# Patient Record
Sex: MALE | Race: OTHER | Employment: FULL TIME | URBAN - METROPOLITAN AREA
[De-identification: names, ages, dates, MRNs, and addresses within clinical notes are randomized per-mention and may not be internally consistent; named-entity substitution may affect disease eponyms.]

---

## 2022-02-14 ENCOUNTER — HOSPITAL ENCOUNTER (OUTPATIENT)
Dept: PHYSICAL THERAPY | Age: 57
Discharge: HOME | End: 2022-02-14
Payer: COMMERCIAL

## 2022-02-14 PROCEDURE — 97110 THERAPEUTIC EXERCISES: CPT

## 2022-02-14 PROCEDURE — 97140 MANUAL THERAPY 1/> REGIONS: CPT

## 2022-02-14 PROCEDURE — 97161 PT EVAL LOW COMPLEX 20 MIN: CPT

## 2022-02-14 NOTE — THERAPY EVALUATION
Sandra Mars  : 1965  Primary: Americo Mcintyre [facility]  Secondary:  [facility] at [address]  [phone]   [phone]        OUTPATIENT PHYSICAL THERAPY:Initial Assessment 2022   ICD-10: Treatment Diagnosis: Patellar Tendonitis, L knee [M76.52]; Chondromalacia patellae, L knee [M22.42]  Precautions/Allergies:   Patient has no known allergies. TREATMENT PLAN:  Effective Dates: 2022 TO 5/15/2022 (90 days). Frequency/Duration: 2 times a week for 90 Day(s) MEDICAL/REFERRING DIAGNOSIS:  Patellar tendinitis, unspecified knee [M76.50]  Chondromalacia patellae, left knee [M22.42]   DATE OF ONSET: chronic  REFERRING PHYSICIAN: Mohinder Lewis MD MD Orders: Evaluate and Treat  Return MD Appointment: TBD     INITIAL ASSESSMENT:  Mr. Carmen San presents with signs and symptoms of referring diagnosis. Pt. Sustained a patellar fracture over a year ago and has not been able to fully get over knee pain since injury. Pt. Experienced pain in the L patellar tendon with squatting activities and during palpation of the tendon. Mobility of the L anterior interval and patellofemoral joint is restricted compared to R side. Meniscus and ligamentous testing is negative. Pt. Demonstrates remaining quadriceps atrophy in the L thigh compared to R side. IT band tightness is also present in the L LE compared to R. Pt. Will benefit from manual therapy to address remaining pain and specific strengthening exercises to improve L quadriceps strength. Pt. Will benefit from skilled PT at this time. PROBLEM LIST (Impacting functional limitations):  1. Decreased Strength  2. Decreased ADL/Functional Activities  3. Increased Pain  4. Decreased Activity Tolerance  5. Decreased Flexibility/Joint Mobility INTERVENTIONS PLANNED: (Treatment may consist of any combination of the following)  1. Home Exercise Program (HEP)  2.  Manual Therapy  3. Neuromuscular Re-education/Strengthening  4. Range of Motion (ROM)  5. Therapeutic Activites  6. Therapeutic Exercise/Strengthening     GOALS: (Goals have been discussed and agreed upon with patient.)  Discharge Goals: Time Frame: 6 weeks  1. Pt. Will report < 4/10 L knee pain with standing, walking, lifting, squatting to improve symptomatic complaints. 2. Pt. Will complete 10 single LE squats without handheld assistance to improve lower quarter strength. 3. Pt. Will be independent with HEP to prevent return of symptoms. 4. Pt. Will score > 70 on the LEFS to demonstrate improved pain and function. OUTCOME MEASURE:   Tool Used: Lower Extremity Functional Scale (LEFS)  Score:  Initial: 62/80 Most Recent: X/80 (Date: -- )   Interpretation of Score: 20 questions each scored on a 5 point scale with 0 representing \"extreme difficulty or unable to perform\" and 4 representing \"no difficulty\". The lower the score, the greater the functional disability. 80/80 represents no disability. Minimal detectable change is 9 points. MEDICAL NECESSITY:   · Patient is expected to demonstrate progress in strength and range of motion to increase independence with ADL and recreational activities. .  REASON FOR SERVICES/OTHER COMMENTS:  · Patient will benefit from skilled PT to address impairments identified through evaluation and return to previous ADL and recreational capacity. Total Duration:  PT Patient Time In/Time Out  Time In: 0930  Time Out: 1030    Rehabilitation Potential For Stated Goals: Good  Regarding Ruthie Zimmerman's therapy, I certify that the treatment plan above will be carried out by a therapist or under their direction. Thank you for this referral,  Shira Epperson PT     Referring Physician Signature: Shahana Maciel MD No Signature is Required for this note.       PAIN/SUBJECTIVE:   Initial: Pain Intensity 1: 8 /10 Post Session:  8/10   HISTORY:   History of Injury/Illness (Reason for Referral):  Pt. Attends PT c/o chronic L knee pain with onset a year and a half ago after falling on the knee and fracturing his patella. Pt. Notes experiencing residual pain in the anterior knee aggravated with prolonged standing, walking, running, kneeling, and squatting. Pt. Reports he will feel as if his L knee gives way with quick movements or unexpected movements on the L knee. Pt. Reports significant difficulty with ascending and descending stairs secondary to pain. Pt. Would like to address pain and return to previous level of function. Past Medical History/Comorbidities:   Mr. Janna Butler  has a past medical history of Hypertriglyceridemia (4/30/2015), IBS (irritable bowel syndrome) (4/30/2015), Vitamin B12 deficiency, and Vitamin D deficiency. Mr. Janna Butler  has no past surgical history on file. Social History/Living Environment:     Lives with wife in two story home. Prior Level of Function/Work/Activity:  Functioned independently without limitations. Ambulatory/Rehab Services H2 Model Falls Risk Assessment   Risk Factors:       (1)  Gender [Male] Ability to Rise from Chair:       (0)  Ability to rise in a single movement   Falls Prevention Plan:       No modifications necessary   Total: (5 or greater = High Risk): 1   ©2010 [facility]. All Rights Reserved. [facility] Patent #[id number]. Federal Law prohibits the replication, distribution or use without written permission from [facility]   Current Medications:       Current Outpatient Medications:     dicyclomine (BENTYL) 10 mg capsule, Take 1 Cap by mouth three (3) times daily as needed for Other (Bloating). , Disp: 90 Cap, Rfl: 1    fexofenadine (ALLEGRA) 180 mg tablet, Take  by mouth daily. , Disp: , Rfl:     cyanocobalamin (VITAMIN B-12) 1,000 mcg tablet, Take 1,000 mcg by mouth daily. , Disp: , Rfl:     Cholecalciferol, Vitamin D3, (VITAMIN D3) 1,000 unit cap, Take  by mouth., Disp: , Rfl:    Date Last Reviewed: 2/14/2022   Number of Personal Factors/Comorbidities that affect the Plan of Care: 0: LOW COMPLEXITY   EXAMINATION:     Palpation:                                             Right                                         Left  Quadriceps: -- tender   Hamstrings -- --   Triceps Surae tender tender   Patellar Tendon -- tender   Pes Anserine -- --   Iliotibial Band -- tender   Other:       Range of Motion:   degrees                              Right                                        Left  Knee Extension 2 2   Knee Flexion 132 130   Ankle Dorsiflexion 8 10     Strength:                                                Right                                        Left  Hip Extension 4+/5 4+/5   Hip Abduction 4+/5 4+/5   Knee Extension 5/5 4+/5   Knee Flexion 5/5 5/5   Functional Squat Limited and painful in L knee      Flexibility:  Iliopsoas: Hamstring: Quadriceps: limited   TFL: limited Calf:        Special Testing:                                    Right                                        Left  Lachman (-) (-)   Posterior Drawer (-) (-)   Valgus Stress (-) (-)   Varus Stress (-) (-)   Amanda (-) (-)   Thessaly (-) (-)   Joint Line Tenderness (-) (-)        Body Structures Involved:  1. Bones  2. Joints  3. Muscles Body Functions Affected:  1. Neuromusculoskeletal  2. Movement Related Activities and Participation Affected:  1. Mobility  2.  Self Care   Number of elements (examined above) that affect the Plan of Care: 1-2: LOW COMPLEXITY   CLINICAL PRESENTATION:   Presentation: Stable and uncomplicated: LOW COMPLEXITY   CLINICAL DECISION MAKING:   Use of outcome tool(s) and clinical judgement create a POC that gives a: Clear prediction of patient's progress: LOW COMPLEXITY

## 2022-02-14 NOTE — PROGRESS NOTES
Gamaliel Cullen  : 1965  Primary: Meg Minors [facility]  Secondary:  [facility] at [address]  [phone]   [phone]      OUTPATIENT PHYSICAL THERAPY: Daily Treatment Note 2022  Visit Count:  1    ICD-10: Treatment Diagnosis: Patellar Tendonitis, L knee [M76.52]; Chondromalacia patellae, L knee [M22.42]  Precautions/Allergies:   Patient has no known allergies. TREATMENT PLAN:  Effective Dates: 2022 TO 5/15/2022 (90 days). Frequency/Duration: 2 times a week for 90 Day(s) MEDICAL/REFERRING DIAGNOSIS:  Patellar tendinitis, unspecified knee [M76.50]  Chondromalacia patellae, left knee [M22.42]   DATE OF ONSET: chronic  REFERRING PHYSICIAN: Ector Avendano MD MD Orders: Evaluate and Treat  Return MD Appointment: TBD       Pre-treatment Symptoms/Complaints:  L knee pain  Pain: Initial: Pain Intensity 1: 8 /10 Post Session:  8/10   Medications Last Reviewed:  2022  Updated Objective Findings:  See evaluation note from today  TREATMENT:     Therapeutic Exercise: (10 Minutes):  Exercises per grid below to improve mobility and strength. Required moderate visual, verbal and manual cues to promote proper body alignment, promote proper body posture and promote proper body mechanics. Progressed resistance, range and repetitions as indicated. Date:  2022   Activity/Exercise Parameters   Quadriceps setting 3 x 10   Quadriceps stretch 3 minutes   IT band stretch 3 minutes                     Manual Therapy (    Soft Tissue Mobilization Duration  Duration: 15 Minutes): Manual techniques to facilitate improved motion and decreased pain.  (Used abbreviations: MET - muscle energy technique; PNF - proprioceptive neuromuscular facilitation; NMR - neuromuscular re-education; a/p - anterior to posterior; p/a - posterior to anterior)   · Joint mobilization: patellofemoral all glides grade V  · Soft tissue mobilization: L quadriceps, patellar tendon, gluteals  · Instrument assisted soft tissue mobilizaiton: L patellar tendon, L gastrocnemius. MedBridge Portal  Treatment/Session Summary:    · Response to Treatment:  Pt. tolerates initial treatment today with mild pain complaints and post treatment soreness. · Communication/Consultation:  None today  · Equipment provided today:  None today  · Recommendations/Intent for next treatment session: Next visit will focus on Manual therapy and quadriceps strengthening. Total Treatment Billable Duration:  35 minutes evaluation, 10 minutes therapeutic exercises, 15 minutes manual therapy.    PT Patient Time In/Time Out  Time In: 0930  Time Out: [address]  Gams, PT    Future Appointments   Date Time Provider Robin Leal   2/21/2022  2:30 PM Jigar Valverde [facility]   2/28/2022  1:30 PM Laura Hutchison, PT [facility]   3/7/2022 10:30 AM Laura Hutchison, PT [facility]   3/14/2022 10:30 AM Laura Hutchison PT [facility]   3/21/2022 10:30 AM Laura Hutchison PT [facility]   3/28/2022 10:30 AM Laura Hutchison, PT [facility]

## 2022-02-21 ENCOUNTER — HOSPITAL ENCOUNTER (OUTPATIENT)
Dept: PHYSICAL THERAPY | Age: 57
Discharge: HOME | End: 2022-02-21
Payer: COMMERCIAL

## 2022-02-21 PROCEDURE — 97140 MANUAL THERAPY 1/> REGIONS: CPT

## 2022-02-21 PROCEDURE — 97110 THERAPEUTIC EXERCISES: CPT

## 2022-02-21 NOTE — PROGRESS NOTES
Surekha Goodman  : 1965  Primary: Gillian Marleny [facility]  Secondary:  [facility] at [address]  [phone]   [phone]      OUTPATIENT PHYSICAL THERAPY: Daily Treatment Note 2022  Visit Count:  2    ICD-10: Treatment Diagnosis: Patellar Tendonitis, L knee [M76.52]; Chondromalacia patellae, L knee [M22.42]  Precautions/Allergies:   Patient has no known allergies. TREATMENT PLAN:  Effective Dates: 2022 TO 5/15/2022 (90 days). Frequency/Duration: 2 times a week for 90 Day(s) MEDICAL/REFERRING DIAGNOSIS:  Patellar tendinitis, unspecified knee [M76.50]  Chondromalacia patellae, left knee [M22.42]   DATE OF ONSET: chronic  REFERRING PHYSICIAN: Colby Lyons MD MD Orders: Evaluate and Treat  Return MD Appointment: TBD       Pre-treatment Symptoms/Complaints:  Pt. Notes a little L knee pain improvement following initial treatment that returned after performing extra duties at work. Pain: Initial: Pain Intensity 1: 10 Post Session:  7/10   Medications Last Reviewed:  2022  Updated Objective Findings:  None Today  TREATMENT:     Therapeutic Exercise: (30 Minutes):  Exercises per grid below to improve mobility and strength. Required moderate visual, verbal and manual cues to promote proper body alignment, promote proper body posture and promote proper body mechanics. Progressed resistance, range and repetitions as indicated. Date:  2022   Activity/Exercise Parameters   Quadriceps setting 3 x 10   Quadriceps stretch 3 minutes   IT band stretch 3 minutes   SLR 45 reps   Eccentric short arc quad 3 x 10   Eccentric modified squat (single and double LE with UE support) 6 minutes   Calf stretch 3 minutes     Manual Therapy (    Soft Tissue Mobilization Duration  Duration: 25 Minutes): Manual techniques to facilitate improved motion and decreased pain.  (Used abbreviations: MET - muscle energy technique; PNF - proprioceptive neuromuscular facilitation; NMR - neuromuscular re-education; a/p - anterior to posterior; p/a - posterior to anterior)   · Joint mobilization: patellofemoral all glides grade IV  · Soft tissue mobilization: L quadriceps, patellar tendon, gluteals  · Instrument assisted soft tissue mobilizaiton: L patellar tendon, L gastrocnemius. MedBridge Portal  Treatment/Session Summary:    · Response to Treatment:  Lateral patellar glide during squatting exercises improves pain during movement. Pt. demonstrates moderate difficulty performing single LE squat on L LE secondary to weakness of the L quadriceps and pain. Pt. may benefit from taping in the L knee. .  · Communication/Consultation:  None today  · Equipment provided today:  None today  · Recommendations/Intent for next treatment session: Next visit will focus on Manual therapy and quadriceps strengthening. Total Treatment Billable Duration:  55 minutes total time.     PT Patient Time In/Time Out  Time In: 1430  Time Out: Matias Up Gams, PT    Future Appointments   Date Time Provider Robin Leal   2/28/2022  1:30 PM Vivien Quivers, PT [facility]   3/7/2022 10:30 AM Vivien Quivers, PT [facility]   3/14/2022 10:30 AM Vivien Quivers, PT [facility]   3/21/2022 10:30 AM Vivien Quivers, PT [facility]   3/28/2022 10:30 AM Vivien Quivers, PT [facility]

## 2022-02-28 ENCOUNTER — HOSPITAL ENCOUNTER (OUTPATIENT)
Dept: PHYSICAL THERAPY | Age: 57
Discharge: HOME | End: 2022-02-28
Payer: COMMERCIAL

## 2022-02-28 PROCEDURE — 97140 MANUAL THERAPY 1/> REGIONS: CPT

## 2022-02-28 PROCEDURE — 97110 THERAPEUTIC EXERCISES: CPT

## 2022-02-28 NOTE — PROGRESS NOTES
Izzy List  : 1965  Primary: Bam Allen [facility]  Secondary:  [facility] at [address]  [phone]   [phone]      OUTPATIENT PHYSICAL THERAPY: Daily Treatment Note 2022  Visit Count:  3    ICD-10: Treatment Diagnosis: Patellar Tendonitis, L knee [M76.52]; Chondromalacia patellae, L knee [M22.42]  Precautions/Allergies:   Patient has no known allergies. TREATMENT PLAN:  Effective Dates: 2022 TO 5/15/2022 (90 days). Frequency/Duration: 2 times a week for 90 Day(s) MEDICAL/REFERRING DIAGNOSIS:  Patellar tendinitis, unspecified knee [M76.50]  Chondromalacia patellae, left knee [M22.42]   DATE OF ONSET: chronic  REFERRING PHYSICIAN: Delio José MD MD Orders: Evaluate and Treat  Return MD Appointment: TBD       Pre-treatment Symptoms/Complaints:  Pt. Denies new pain complaints this week. Pain: Initial: Pain Intensity 1: 10 Post Session:  7/10   Medications Last Reviewed:  2022  Updated Objective Findings:  Improved tenderness to palpation of the L patellar tendon. TREATMENT:     Therapeutic Exercise: (40 Minutes):  Exercises per grid below to improve mobility and strength. Required moderate visual, verbal and manual cues to promote proper body alignment, promote proper body posture and promote proper body mechanics. Progressed resistance, range and repetitions as indicated. Date:  2022   Activity/Exercise Parameters   Quadriceps setting 3 x 10   Quadriceps stretch 3 minutes   IT band stretch 3 minutes   SLR 45 reps   Eccentric short arc quad --   Eccentric modified squat (single and double LE with UE support) 6 minutes   Calf stretch 3 minutes   Shuttle Squats single and double 5 minutes   Sidelying hip abduction 3 x 10   clams 3 x 10     Manual Therapy (    Soft Tissue Mobilization Duration  Duration: 15 Minutes): Manual techniques to facilitate improved motion and decreased pain. (Used abbreviations: MET - muscle energy technique; PNF - proprioceptive neuromuscular facilitation; NMR - neuromuscular re-education; a/p - anterior to posterior; p/a - posterior to anterior)   · Joint mobilization: patellofemoral all glides grade IV  · Soft tissue mobilization: L quadriceps, patellar tendon, gluteals  · Instrument assisted soft tissue mobilizaiton: L patellar tendon, L gastrocnemius. MedBridge Portal  Treatment/Session Summary:    · Response to Treatment:  McConnel tape is utilized today with a medial glide applied. Taping allowed for improved tolerance to quadriceps strengthening with less pain. .  · Communication/Consultation:  None today  · Equipment provided today:  None today  · Recommendations/Intent for next treatment session: Next visit will focus on Manual therapy and quadriceps strengthening. Total Treatment Billable Duration:  55 minutes total time.     PT Patient Time In/Time Out  Time In: 1330  Time Out: 205 Juli Murry PT    Future Appointments   Date Time Provider Robin Leal   3/7/2022 10:30 AM Jerman Ryan PT [facility]   3/14/2022 10:30 AM Jerman Ryan PT [facility]   3/21/2022 10:30 AM Jerman Ryan PT [facility]   3/28/2022 10:30 AM Jerman Ryan PT [facility]

## 2022-03-07 ENCOUNTER — HOSPITAL ENCOUNTER (OUTPATIENT)
Dept: PHYSICAL THERAPY | Age: 57
Discharge: HOME | End: 2022-03-07
Payer: COMMERCIAL

## 2022-03-07 PROCEDURE — 97140 MANUAL THERAPY 1/> REGIONS: CPT

## 2022-03-07 PROCEDURE — 97110 THERAPEUTIC EXERCISES: CPT

## 2022-03-07 NOTE — PROGRESS NOTES
Bimal Galindo  : 1965  Primary: Sandy Amaro [facility]  Secondary:  [facility] at [address]  [phone]   [phone]      OUTPATIENT PHYSICAL THERAPY: Daily Treatment Note 3/7/2022  Visit Count:  4    ICD-10: Treatment Diagnosis: Patellar Tendonitis, L knee [M76.52]; Chondromalacia patellae, L knee [M22.42]  Precautions/Allergies:   Patient has no known allergies. TREATMENT PLAN:  Effective Dates: 2022 TO 5/15/2022 (90 days). Frequency/Duration: 2 times a week for 90 Day(s) MEDICAL/REFERRING DIAGNOSIS:  Patellar tendinitis, unspecified knee [M76.50]  Chondromalacia patellae, left knee [M22.42]   DATE OF ONSET: chronic  REFERRING PHYSICIAN: Shahana Maciel MD MD Orders: Evaluate and Treat  Return MD Appointment: TBD       Pre-treatment Symptoms/Complaints:  Pt. Note L knee pain was a little better. Pt. Aggravated pain with lifting at work. Pain: Initial: Pain Intensity 1: 7 /10 Post Session:  7/10   Medications Last Reviewed:  3/7/2022  Updated Objective Findings:  None Today  TREATMENT:     Therapeutic Exercise: (40 Minutes):  Exercises per grid below to improve mobility and strength. Required moderate visual, verbal and manual cues to promote proper body alignment, promote proper body posture and promote proper body mechanics. Progressed resistance, range and repetitions as indicated.      Date:  3/7/2022   Activity/Exercise Parameters   Quadriceps setting 3 x 10   Quadriceps stretch 3 minutes   IT band stretch --   SLR 45 reps   Eccentric short arc quad --   Eccentric modified squat (single and double LE with UE support) 6 minutes   Calf stretch --   Shuttle Squats single and double 5 minutes   Sidelying hip abduction 3 x 10   clams --   2\" step downs 3 x 10   Golfers lift 3 x 10     Manual Therapy (    Soft Tissue Mobilization Duration  Duration: 15 Minutes): Manual techniques to facilitate improved motion and decreased pain. (Used abbreviations: MET - muscle energy technique; PNF - proprioceptive neuromuscular facilitation; NMR - neuromuscular re-education; a/p - anterior to posterior; p/a - posterior to anterior)   · Joint mobilization: patellofemoral all glides grade IV  · Soft tissue mobilization: L quadriceps, patellar tendon, gluteals  · Instrument assisted soft tissue mobilizaiton: L patellar tendon, L gastrocnemius. [facility]  Treatment/Session Summary:    · Response to Treatment:  Pt. continues to improve tolerance with quadriceps strengthening. Pt. fatigues easily in the L quadriceps with shuttle, squat, and step down exercises. .  · Communication/Consultation:  None today  · Equipment provided today:  None today  · Recommendations/Intent for next treatment session: Next visit will focus on Manual therapy and quadriceps strengthening. Total Treatment Billable Duration:  55 minutes total time.     PT Patient Time In/Time Out  Time In: 1030  Time Out: [address] [facility] Jeanmarie, BETTYE    Future Appointments   Date Time Provider Robin Leal   3/14/2022 10:30 AM Merryl Mannheim, PT [facility]   3/21/2022 10:30 AM Merryl Mannheim, PT [facility]   3/28/2022 10:30 AM Merryl Mannheim, PT [facility]

## 2022-03-14 ENCOUNTER — HOSPITAL ENCOUNTER (OUTPATIENT)
Dept: PHYSICAL THERAPY | Age: 57
Discharge: HOME | End: 2022-03-14
Payer: COMMERCIAL

## 2022-03-14 PROCEDURE — 97110 THERAPEUTIC EXERCISES: CPT

## 2022-03-14 PROCEDURE — 97140 MANUAL THERAPY 1/> REGIONS: CPT

## 2022-03-14 NOTE — PROGRESS NOTES
Janelle Beth  : 1965  Primary: Sumanth Guillermo [facility]  Secondary:  [facility] at [address]  [phone]   [phone]      OUTPATIENT PHYSICAL THERAPY: Daily Treatment Note 3/14/2022  Visit Count:  5    ICD-10: Treatment Diagnosis: Patellar Tendonitis, L knee [M76.52]; Chondromalacia patellae, L knee [M22.42]  Precautions/Allergies:   Patient has no known allergies. TREATMENT PLAN:  Effective Dates: 2022 TO 5/15/2022 (90 days). Frequency/Duration: 2 times a week for 90 Day(s) MEDICAL/REFERRING DIAGNOSIS:  Patellar tendinitis, unspecified knee [M76.50]  Chondromalacia patellae, left knee [M22.42]   DATE OF ONSET: chronic  REFERRING PHYSICIAN: Della Lorenz MD MD Orders: Evaluate and Treat  Return MD Appointment: TBD       Pre-treatment Symptoms/Complaints:  Pt. Reports L knee pain is slightly better. .    Pain: Initial: Pain Intensity 1: 10 Post Session:  6/10   Medications Last Reviewed:  3/14/2022  Updated Objective Findings:  valgus moment at knee with repeated step downs from 2\" step. TREATMENT:     Therapeutic Exercise: (40 Minutes):  Exercises per grid below to improve mobility and strength. Required moderate visual, verbal and manual cues to promote proper body alignment, promote proper body posture and promote proper body mechanics. Progressed resistance, range and repetitions as indicated.      Date:  3/14/2022   Activity/Exercise Parameters   Quadriceps setting 3 x 10   Quadriceps stretch 3 minutes   IT band stretch --   SLR 45 reps   Eccentric short arc quad --   Eccentric modified squat (single and double LE with UE support) 6 minutes   Calf stretch --   Shuttle Squats single and double 5 minutes   Sidelying hip abduction 3 x 10   clams 3 x 10   2\" step downs 3 x 10   Golfers lift 3 x 10     Manual Therapy (    Soft Tissue Mobilization Duration  Duration: 15 Minutes): Manual techniques to facilitate improved motion and decreased pain. (Used abbreviations: MET - muscle energy technique; PNF - proprioceptive neuromuscular facilitation; NMR - neuromuscular re-education; a/p - anterior to posterior; p/a - posterior to anterior)   · Joint mobilization: patellofemoral all glides grade IV  · Soft tissue mobilization: L quadriceps, patellar tendon, gluteals  · Instrument assisted soft tissue mobilizaiton: L patellar tendon, L gastrocnemius. MedBridge Portal  Treatment/Session Summary:    · Response to Treatment:  Pt. continues to require manual and verbal cueing on appropriate knee control during step downs and squatting activities. Pt. continues to demonstrate valgus moment during movements at the knee. .  · Communication/Consultation:  None today  · Equipment provided today:  None today  · Recommendations/Intent for next treatment session: Next visit will focus on Manual therapy and quadriceps strengthening. Total Treatment Billable Duration:  55 minutes total time.     PT Patient Time In/Time Out  Time In: 1030  Time Out: [id number] [facility] BETTYE Murry    Future Appointments   Date Time Provider Robin Leal   3/21/2022 10:30 AM BETTYE Falcon   3/28/2022 10:30 AM BETTYE Falcon

## 2022-03-21 ENCOUNTER — HOSPITAL ENCOUNTER (OUTPATIENT)
Dept: PHYSICAL THERAPY | Age: 57
Discharge: HOME | End: 2022-03-21
Payer: COMMERCIAL

## 2022-03-21 PROCEDURE — 97140 MANUAL THERAPY 1/> REGIONS: CPT

## 2022-03-21 PROCEDURE — 97110 THERAPEUTIC EXERCISES: CPT

## 2022-03-21 NOTE — PROGRESS NOTES
Lorena Ralph  : 1965  Primary: [facility]  Secondary:  [facility] at [address]  [phone]   [phone]      OUTPATIENT PHYSICAL THERAPY: Daily Treatment Note 3/21/2022  Visit Count:  6    ICD-10: Treatment Diagnosis: Patellar Tendonitis, L knee [M76.52]; Chondromalacia patellae, L knee [M22.42]  Precautions/Allergies:   Patient has no known allergies. TREATMENT PLAN:  Effective Dates: 2022 TO 5/15/2022 (90 days). Frequency/Duration: 2 times a week for 90 Day(s) MEDICAL/REFERRING DIAGNOSIS:  Patellar tendinitis, unspecified knee [M76.50]  Chondromalacia patellae, left knee [M22.42]   DATE OF ONSET: chronic  REFERRING PHYSICIAN: Reba Hylton MD MD Orders: Evaluate and Treat  Return MD Appointment: TBD       Pre-treatment Symptoms/Complaints:  Pt. Notes one episode of moderate knee pain at work after pushing heavy object and turning at the same time. Pain improved over the week. Pain: Initial: Pain Intensity 1: 5 /10 Post Session:  5/10   Medications Last Reviewed:  3/21/2022  Updated Objective Findings:  None Today  TREATMENT:     Therapeutic Exercise: (45 Minutes):  Exercises per grid below to improve mobility and strength. Required moderate visual, verbal and manual cues to promote proper body alignment, promote proper body posture and promote proper body mechanics. Progressed resistance, range and repetitions as indicated.      Date:  3/21/2022   Activity/Exercise Parameters   Quadriceps setting --   Quadriceps stretch 3 minutes   IT band stretch 2 minutes   SLR 45 reps   Terminal knee extension (purple band) 3 x 15   Eccentric modified squat (single and double LE with UE support) 6 minutes   Calf stretch --   Shuttle Squats single and double 5 minutes   Sidelying hip abduction 3 x 10   clams --   2\" step downs --   Golfers lift 3 x 10   Bridges with LE extensions 3 x 10   Single LE balance with twists 3 x 10     Manual Therapy (    Soft Tissue Mobilization Duration  Duration: 10 Minutes): Manual techniques to facilitate improved motion and decreased pain. (Used abbreviations: MET - muscle energy technique; PNF - proprioceptive neuromuscular facilitation; NMR - neuromuscular re-education; a/p - anterior to posterior; p/a - posterior to anterior)   · Joint mobilization: patellofemoral all glides grade IV  · Soft tissue mobilization: L quadriceps, patellar tendon, gluteals  · Instrument assisted soft tissue mobilizaiton: L patellar tendon, L gastrocnemius. MedBridge Portal  Treatment/Session Summary:    · Response to Treatment:  Pt. demonstrates improved tolerance to L quadriceps strengthening without the addition of taping techniques to improve patellar tracking. Pt. will benefit from continued quadricep strengthening. .  · Communication/Consultation:  None today  · Equipment provided today:  None today  · Recommendations/Intent for next treatment session: Next visit will focus on Manual therapy and quadriceps strengthening. Total Treatment Billable Duration:  55 minutes total time.     PT Patient Time In/Time Out  Time In: 1030  Time Out: [address] [facility] Jeanmarie, BETTYE    Future Appointments   Date Time Provider Robin Leal   3/28/2022 10:30 AM Gema Cochran, PT [facility]   4/4/2022 10:30 AM Gema Cochran, PT [facility]   4/11/2022 10:30 AM Gema Cochran, PT [facility]   4/18/2022 10:30 AM Gema Cochran, PT [facility]   4/25/2022 10:30 AM Gema Cochran, PT [facility]

## 2022-03-28 ENCOUNTER — HOSPITAL ENCOUNTER (OUTPATIENT)
Dept: PHYSICAL THERAPY | Age: 57
End: 2022-03-28
Payer: COMMERCIAL

## 2022-04-04 ENCOUNTER — HOSPITAL ENCOUNTER (OUTPATIENT)
Dept: PHYSICAL THERAPY | Age: 57
Discharge: HOME | End: 2022-04-04
Payer: COMMERCIAL

## 2022-04-04 PROCEDURE — 97110 THERAPEUTIC EXERCISES: CPT

## 2022-04-04 PROCEDURE — 97140 MANUAL THERAPY 1/> REGIONS: CPT

## 2022-04-04 NOTE — PROGRESS NOTES
Shari Santos  : 1965  [facility]  Secondary:  [facility] at [address]  [phone]   [phone]      OUTPATIENT PHYSICAL THERAPY: Daily Treatment Note 2022  Visit Count:  7    ICD-10: Treatment Diagnosis: Patellar Tendonitis, L knee [M76.52]; Chondromalacia patellae, L knee [M22.42]  Precautions/Allergies:   Patient has no known allergies. TREATMENT PLAN:  Effective Dates: 2022 TO 5/15/2022 (90 days). Frequency/Duration: 2 times a week for 90 Day(s) MEDICAL/REFERRING DIAGNOSIS:  Patellar tendinitis, unspecified knee [M76.50]  Chondromalacia patellae, left knee [M22.42]   DATE OF ONSET: chronic  REFERRING PHYSICIAN: Venita Clark MD MD Orders: Evaluate and Treat  Return MD Appointment: TBD       Pre-treatment Symptoms/Complaints:  Pt. Reports continued small improvements in L knee pain. Pt. Continues to feel pain descending stairs. Pain: Initial: Pain Intensity 1: 4 /10 Post Session:  4/10   Medications Last Reviewed:  2022  Updated Objective Findings:  Improved tenderness to palpation of the L patellar tendon. TREATMENT:     Therapeutic Exercise: (45 Minutes):  Exercises per grid below to improve mobility and strength. Required moderate visual, verbal and manual cues to promote proper body alignment, promote proper body posture and promote proper body mechanics. Progressed resistance, range and repetitions as indicated.      Date:  2022   Activity/Exercise Parameters   Quadriceps setting --   Quadriceps stretch 3 minutes   IT band stretch 2 minutes   SLR 50 reps   Terminal knee extension (purple band) --   Eccentric modified squat (single and double LE with UE support) 6 minutes   Step ups 3 x 10   Shuttle Squats single and double 5 minutes   Sidelying hip abduction 3 x 10   clams --   2\" step downs 3 x 10   Golfers lift 3 x 10   Bridges with LE extensions 3 x 10   Single LE balance with twists --     Manual Therapy (    Soft Tissue Mobilization Duration  Duration: 10 Minutes): Manual techniques to facilitate improved motion and decreased pain. (Used abbreviations: MET - muscle energy technique; PNF - proprioceptive neuromuscular facilitation; NMR - neuromuscular re-education; a/p - anterior to posterior; p/a - posterior to anterior)   · Joint mobilization: patellofemoral all glides grade IV  · Soft tissue mobilization: L quadriceps, patellar tendon, gluteals  · Instrument assisted soft tissue mobilizaiton: L patellar tendon, L gastrocnemius. MedBridge Portal  Treatment/Session Summary:    · Response to Treatment:  Pt. demonstrates improved quadriceps endurance today and requires less manual PT cueing for appropriate L quadriceps control. Pt. will benefit from continued L quadriceps strengthening. .  · Communication/Consultation:  None today  · Equipment provided today:  None today  · Recommendations/Intent for next treatment session: Next visit will focus on Manual therapy and quadriceps strengthening. Total Treatment Billable Duration:  55 minutes total time.     PT Patient Time In/Time Out  Time In: 1030  Time Out: [address] [employer] [facility] BETTYE Murry    Future Appointments   Date Time Provider oRbin Leal   4/11/2022 10:30 AM Arlyn Alvarado PT [facility]   4/18/2022 10:30 AM Arlyn Alvarado PT [facility]   4/25/2022 10:30 AM Arlyn Alvarado PT [facility]

## 2022-04-11 ENCOUNTER — HOSPITAL ENCOUNTER (OUTPATIENT)
Dept: PHYSICAL THERAPY | Age: 57
Discharge: HOME | End: 2022-04-11
Payer: COMMERCIAL

## 2022-04-11 PROCEDURE — 97110 THERAPEUTIC EXERCISES: CPT

## 2022-04-11 PROCEDURE — 97140 MANUAL THERAPY 1/> REGIONS: CPT

## 2022-04-11 NOTE — PROGRESS NOTES
Nora Mis  : 1965  Primary: [facility]  Secondary:  [facility] at [address]  [phone]   [phone]      OUTPATIENT PHYSICAL THERAPY: Daily Treatment Note 2022  Visit Count:  8    ICD-10: Treatment Diagnosis: Patellar Tendonitis, L knee [M76.52]; Chondromalacia patellae, L knee [M22.42]  Precautions/Allergies:   Patient has no known allergies. TREATMENT PLAN:  Effective Dates: 2022 TO 5/15/2022 (90 days). Frequency/Duration: 2 times a week for 90 Day(s) MEDICAL/REFERRING DIAGNOSIS:  Patellar tendinitis, unspecified knee [M76.50]  Chondromalacia patellae, left knee [M22.42]   DATE OF ONSET: chronic  REFERRING PHYSICIAN: Maicol Castano MD MD Orders: Evaluate and Treat  Return MD Appointment: TBD       Pre-treatment Symptoms/Complaints:  Pt. Notes two episodes at work last week that resulted in temporary sharp pain in the L knee. 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 Pain: Initial: Pain Intensity 1: 10 Post Session:  4/10   Medications Last Reviewed:  2022  Updated Objective Findings:  None Today  TREATMENT:     Therapeutic Exercise: (45 Minutes):  Exercises per grid below to improve mobility and strength. Required moderate visual, verbal and manual cues to promote proper body alignment, promote proper body posture and promote proper body mechanics. Progressed resistance, range and repetitions as indicated.      Date:  2022   Activity/Exercise Parameters   Quadriceps setting --   Quadriceps stretch 3 minutes   IT band stretch 2 minutes   SLR --   Terminal knee extension (purple band) --   Eccentric modified squat (single and double LE with UE support) 6 minutes   Step ups 3 x 10   Shuttle Squats single and double 5 minutes   Sidelying hip abduction 3 x 10   clams --   2\" step downs 3 x 10   Golfers lift 3 x 10   Bridges with LE extensions 3 x 10   Single LE balance with twists -- stairs 2 flights     Manual Therapy (    Soft Tissue Mobilization Duration  Duration: 10 Minutes): Manual techniques to facilitate improved motion and decreased pain. (Used abbreviations: MET - muscle energy technique; PNF - proprioceptive neuromuscular facilitation; NMR - neuromuscular re-education; a/p - anterior to posterior; p/a - posterior to anterior)   · Joint mobilization: patellofemoral all glides grade IV  · Soft tissue mobilization: L quadriceps, patellar tendon, gluteals  · Instrument assisted soft tissue mobilizaiton: L patellar tendon, L gastrocnemius. (NOT PERFORMED)    [facility] Portal  Treatment/Session Summary:    · Response to Treatment:  Assessment of stair performance reveals patient descending sideways one step at a time. Pt. experiences knee pain during decent in the L patellar region. Pt. is advised to walk stairs normally as pain allows. .  · Communication/Consultation:  None today  · Equipment provided today:  None today  · Recommendations/Intent for next treatment session: Next visit will focus on Manual therapy and quadriceps strengthening. Total Treatment Billable Duration:  55 minutes total time.     PT Patient Time In/Time Out  Time In: 1030  Time Out: [address] [employer] BETTYE Murry    Future Appointments   Date Time Provider Robin Leal   4/18/2022 10:30 AM BETTYE Ervin   4/25/2022 10:30 AM BETTYE Ervin

## 2022-04-18 ENCOUNTER — HOSPITAL ENCOUNTER (OUTPATIENT)
Dept: PHYSICAL THERAPY | Age: 57
Discharge: HOME | End: 2022-04-18
Payer: COMMERCIAL

## 2022-04-18 PROCEDURE — 97140 MANUAL THERAPY 1/> REGIONS: CPT

## 2022-04-18 PROCEDURE — 97110 THERAPEUTIC EXERCISES: CPT

## 2022-04-18 NOTE — PROGRESS NOTES
Thong Garces  : 1965  Primary: [facility]  Secondary:  [facility]rapy Center at [address]  [phone]   [phone]      OUTPATIENT PHYSICAL THERAPY: Daily Treatment Note 2022  Visit Count:  9    ICD-10: Treatment Diagnosis: Patellar Tendonitis, L knee [M76.52]; Chondromalacia patellae, L knee [M22.42]  Precautions/Allergies:   Patient has no known allergies. TREATMENT PLAN:  Effective Dates: 2022 TO 5/15/2022 (90 days). Frequency/Duration: 2 times a week for 90 Day(s) MEDICAL/REFERRING DIAGNOSIS:  Patellar tendinitis, unspecified knee [M76.50]  Chondromalacia patellae, left knee [M22.42]   DATE OF ONSET: chronic  REFERRING PHYSICIAN: Radha Lainez MD MD Orders: Evaluate and Treat  Return MD Appointment: TBD       Pre-treatment Symptoms/Complaints:  Pt. Reports some progress with descending stairs normally over the past week. Pain: Initial: Pain Intensity 1: 10 Post Session:  4/10   Medications Last Reviewed:  2022  Updated Objective Findings:  medial patellar glide hypomobile in L knee. TREATMENT:     Therapeutic Exercise: (45 Minutes):  Exercises per grid below to improve mobility and strength. Required moderate visual, verbal and manual cues to promote proper body alignment, promote proper body posture and promote proper body mechanics. Progressed resistance, range and repetitions as indicated.      Date:  2022   Activity/Exercise Parameters   Quadriceps setting --   Quadriceps stretch 3 minutes   IT band stretch 2 minutes   SLR 2 x 30   Terminal knee extension (purple band) 3 x 15   Eccentric modified squat (single and double LE with UE support) 6 minutes   Step ups --   Shuttle Squats single and double 5 minutes   Sidelying hip abduction 3 x 10   clams --   2\" step downs 3 x 10   Golfers lift 3 x 10   Bridges with LE extensions 3 x 10   Single LE balance with twists --   stairs 2 flights     Manual Therapy (    Soft Tissue Mobilization Duration  Duration: 10 Minutes): Manual techniques to facilitate improved motion and decreased pain. (Used abbreviations: MET - muscle energy technique; PNF - proprioceptive neuromuscular facilitation; NMR - neuromuscular re-education; a/p - anterior to posterior; p/a - posterior to anterior)   · Joint mobilization: patellofemoral all glides grade IV  · Soft tissue mobilization: L quadriceps, patellar tendon, gluteals  · Instrument assisted soft tissue mobilizaiton: L patellar tendon, L gastrocnemius. (NOT PERFORMED)    [facility] Portal  Treatment/Session Summary:    · Response to Treatment:  Pt. descends stairs with mild L anterior knee pain during stance phase on the L side. Pt. continues to demonstrate patellofemoral pain with eccentric quadriceps work. .  · Communication/Consultation:  None today  · Equipment provided today:  None today  · Recommendations/Intent for next treatment session: Next visit will focus on Manual therapy and quadriceps strengthening. Total Treatment Billable Duration:  55 minutes total time.     PT Patient Time In/Time Out  Time In: 1030  Time Out: [address] [place] [facility] [place] BETTYE Murry    Future Appointments   Date Time Provider Robin Leal   4/25/2022 10:30 AM BETTYE Null [facility]

## 2022-04-25 ENCOUNTER — HOSPITAL ENCOUNTER (OUTPATIENT)
Dept: PHYSICAL THERAPY | Age: 57
Discharge: HOME | End: 2022-04-25
Payer: COMMERCIAL

## 2022-04-25 NOTE — PROGRESS NOTES
Ivonne Moreau  : 1965  Payor: Michael Face / Plan: [place] [facility] / Product Type: PPO /  [address]  at [address]  [phone]   [phone]          DATE: 2022    Patient cancelled appointment today due to illness. Will plan to follow up on next scheduled visit.     Loepz Briggs, PT, DPT, OCS

## 2022-05-09 ENCOUNTER — HOSPITAL ENCOUNTER (OUTPATIENT)
Dept: PHYSICAL THERAPY | Age: 57
Discharge: HOME | End: 2022-05-09
Payer: COMMERCIAL

## 2022-05-09 PROCEDURE — 97140 MANUAL THERAPY 1/> REGIONS: CPT

## 2022-05-09 PROCEDURE — 97110 THERAPEUTIC EXERCISES: CPT

## 2022-05-09 NOTE — PROGRESS NOTES
Tramaine Tanner  : 1965  Primary: [facility]  Secondary:  Therapy Center at [address]  [phone]   [phone]      OUTPATIENT PHYSICAL THERAPY: Daily Treatment Note 2022  Visit Count:  10    ICD-10: Treatment Diagnosis: Patellar Tendonitis, L knee [M76.52]; Chondromalacia patellae, L knee [M22.42]  Precautions/Allergies:   Patient has no known allergies. TREATMENT PLAN:  Effective Dates: 2022 TO 2022 (90 days). Frequency/Duration: 2 times a week for 90 Day(s) MEDICAL/REFERRING DIAGNOSIS:  Patellar tendinitis, unspecified knee [M76.50]  Chondromalacia patellae, left knee [M22.42]   DATE OF ONSET: chronic  REFERRING PHYSICIAN: Maylin Gordon MD MD Orders: Evaluate and Treat  Return MD Appointment: TBD       Pre-treatment Symptoms/Complaints:  Pt. Notes f/u with referring MD whom provided options of gel shots in the knee or PRP injection. Pt. Will wait until PT is done to decide which injection to try. Pain: Initial: Pain Intensity 1: 4 /10 Post Session:  4/10   Medications Last Reviewed:  2022  Updated Objective Findings:  See evaluation note from today  TREATMENT:     Therapeutic Exercise: (45 Minutes):  Exercises per grid below to improve mobility and strength. Required moderate visual, verbal and manual cues to promote proper body alignment, promote proper body posture and promote proper body mechanics. Progressed resistance, range and repetitions as indicated.      Date:  2022   Activity/Exercise Parameters   Quadriceps setting --   Quadriceps stretch --   IT band stretch 4 minutes   SLR --   Terminal knee extension (purple band) --   Single LE squat handheld assist 6 minutes   Step ups --   Shuttle Squats single and double 5 minutes   Sidelying hip abduction 3 x 10   clams --   2\" step downs 3 x 10   Golfers lift --   Bridges with LE extensions 3 x 10   Single LE balance with twists --   stars 5 minutes   Deep squats 3 x 10   lunges 2 x 8     Manual Therapy (    Soft Tissue Mobilization Duration  Duration: 10 Minutes): Manual techniques to facilitate improved motion and decreased pain. (Used abbreviations: MET - muscle energy technique; PNF - proprioceptive neuromuscular facilitation; NMR - neuromuscular re-education; a/p - anterior to posterior; p/a - posterior to anterior)   · Joint mobilization: patellofemoral all glides grade IV  · Soft tissue mobilization: L quadriceps, patellar tendon, gluteals  · Instrument assisted soft tissue mobilizaiton: L patellar tendon, L gastrocnemius. (NOT PERFORMED)    Med[facility] Portal  Treatment/Session Summary:    · Response to Treatment:  Pt. tolerates lower quarter strengthening today with mild complaint of anterior knee pain with step up and single LE squating exercises. Pt. continues to demonstrate improved quadriceps strength. .  · Communication/Consultation:  None today  · Equipment provided today:  None today  · Recommendations/Intent for next treatment session: Next visit will focus on Manual therapy and quadriceps strengthening. Total Treatment Billable Duration:  55 minutes total time.     PT Patient Time In/Time Out  Time In: 1430  Time Out: Matias Harman 4 Jennifers, PT    Future Appointments   Date Time Provider Robin Leal   5/16/2022  7:30 AM Liliana Maharaj, PT [facility]   5/23/2022 10:30 AM Liliana Maharaj, PT [facility]   6/13/2022 10:30 AM Liliana Maharaj, PT [facility]   6/20/2022 10:30 AM Liliana Maharaj, PT [facility]   6/27/2022 10:30 AM Liliana Maharaj, PT [facility]

## 2022-05-09 NOTE — THERAPY RECERTIFICATION
Elena Jang  : 1965  Primary: [facility]*  Secondary:  Therapy Center at [address]  [phone]   [phone]        OUTPATIENT PHYSICAL THERAPY:Recertification 660   ICD-10: Treatment Diagnosis: Patellar Tendonitis, L knee [M76.52]; Chondromalacia patellae, L knee [M22.42]  Precautions/Allergies:   Patient has no known allergies. TREATMENT PLAN:  Effective Dates: 2022 TO 2022 (90 days). Frequency/Duration: 2 times a week for 90 Day(s) MEDICAL/REFERRING DIAGNOSIS:  Patellar tendinitis, unspecified knee [M76.50]  Chondromalacia patellae, left knee [M22.42]   DATE OF ONSET: chronic  REFERRING PHYSICIAN: Jack Brito MD MD Orders: Evaluate and Treat  Return MD Appointment: TBD     INITIAL ASSESSMENT:  Mr. Paula Flores presents with signs and symptoms of referring diagnosis. Pt. Sustained a patellar fracture over a year ago and has not been able to fully get over knee pain since injury. Pt. Experienced pain in the L patellar tendon with squatting activities and during palpation of the tendon. Mobility of the L anterior interval and patellofemoral joint is restricted compared to R side. Meniscus and ligamentous testing is negative. Pt. Demonstrates remaining quadriceps atrophy in the L thigh compared to R side. IT band tightness is also present in the L LE compared to R. Pt. Will benefit from manual therapy to address remaining pain and specific strengthening exercises to improve L quadriceps strength. Pt. Will benefit from skilled PT at this time. 22 Progress Report: Pt. Attends 10 physical therapy sessions. Pt. Demonstrates improved subjective L knee pain with daily activities and now reports a 3/10 pain with ADL's.   The pain does increase with descending stairs, athletic activities, and pushing/pivoting on the L LE.  L quadriceps strength continues to improve with prescribed exercises but remains limited compared to R side. Pt. Is no longer tender to palpation of the patellar tendon. Pt. Will benefit from continued strengthening in the L hip and quadriceps to address remaining pain and limitations. PROBLEM LIST (Impacting functional limitations):  1. Decreased Strength  2. Decreased ADL/Functional Activities  3. Increased Pain  4. Decreased Activity Tolerance  5. Decreased Flexibility/Joint Mobility INTERVENTIONS PLANNED: (Treatment may consist of any combination of the following)  1. Home Exercise Program (HEP)  2. Manual Therapy  3. Neuromuscular Re-education/Strengthening  4. Range of Motion (ROM)  5. Therapeutic Activites  6. Therapeutic Exercise/Strengthening     GOALS: (Goals have been discussed and agreed upon with patient.)  Discharge Goals: Time Frame: 6 weeks  1. Pt. Will report < 4/10 L knee pain with standing, walking, lifting, squatting to improve symptomatic complaints. ONGOING  2. Pt. Will complete 10 single LE squats without handheld assistance to improve lower quarter strength. ONGOING  3. Pt. Will be independent with HEP to prevent return of symptoms. ONGOING  4. Pt. Will score > 70 on the LEFS to demonstrate improved pain and function. ONGOING    OUTCOME MEASURE:   Tool Used: Lower Extremity Functional Scale (LEFS)  Score:  Initial: 62/80 Most Recent: 64/80 (Date: 5/9/22 )   Interpretation of Score: 20 questions each scored on a 5 point scale with 0 representing \"extreme difficulty or unable to perform\" and 4 representing \"no difficulty\". The lower the score, the greater the functional disability. 80/80 represents no disability. Minimal detectable change is 9 points.     UPDATED OBJECTIVE FINDINGS:                                      Right                                         Left  Quadriceps: -- --   Hamstrings -- --   Triceps Surae -- --   Patellar Tendon -- --   Pes Anserine -- --   Iliotibial Band -- tender   Other:       Range of Motion:   degrees Right                                        Left  Knee Extension 2 2   Knee Flexion 132 130   Ankle Dorsiflexion 8 10     Strength:                                                Right                                        Left  Hip Extension 4+/5 4+/5   Hip Abduction 4+/5 4+/5   Knee Extension 5/5 4+/5   Knee Flexion 5/5 5/5   Functional Squat Limited but no longer painful      Flexibility:  Iliopsoas: Hamstring: Quadriceps: limited   TFL: limited Calf:        MEDICAL NECESSITY:   · Patient is expected to demonstrate progress in strength and range of motion to increase independence with ADL and recreational activities. .  REASON FOR SERVICES/OTHER COMMENTS:  · Patient will benefit from skilled PT to address impairments identified through evaluation and return to previous ADL and recreational capacity. Total Duration:  PT Patient Time In/Time Out  Time In: 1430  Time Out: 1530    Rehabilitation Potential For Stated Goals: Good  Regarding Carrie Zimmerman's therapy, I certify that the treatment plan above will be carried out by a therapist or under their direction. Thank you for this referral,  Yonatan Diaz, PT     Referring Physician Signature:  Kamar Correia MD _______________________________ Date _____________

## 2022-05-16 ENCOUNTER — HOSPITAL ENCOUNTER (OUTPATIENT)
Dept: PHYSICAL THERAPY | Age: 57
Discharge: HOME | End: 2022-05-16
Payer: COMMERCIAL

## 2022-05-16 PROCEDURE — 97110 THERAPEUTIC EXERCISES: CPT

## 2022-05-16 PROCEDURE — 97140 MANUAL THERAPY 1/> REGIONS: CPT

## 2022-05-16 NOTE — PROGRESS NOTES
Jay Calixto  : 1965  Primary: [facility]  Secondary:  [facility], [address] [facility]  [phone]   [phone]      OUTPATIENT PHYSICAL THERAPY: Daily Treatment Note 2022  Visit Count:  11    ICD-10: Treatment Diagnosis: Patellar Tendonitis, L knee [M76.52]; Chondromalacia patellae, L knee [M22.42]  Precautions/Allergies:   Patient has no known allergies. TREATMENT PLAN:  Effective Dates: 2022 TO 2022 (90 days). Frequency/Duration: 2 times a week for 90 Day(s) MEDICAL/REFERRING DIAGNOSIS:  Patellar tendinitis, unspecified knee [M76.50]  Chondromalacia patellae, left knee [M22.42]   DATE OF ONSET: chronic  REFERRING PHYSICIAN: Denis Matthews MD MD Orders: Evaluate and Treat  Return MD Appointment: TBD       Pre-treatment Symptoms/Complaints:  Pt. Reports only one episode of bad knee pain last week. Episode occurred after twisting and pushing off L LE. Pain: Initial: Pain Intensity 1: 10 Post Session:  4/10   Medications Last Reviewed:  2022  Updated Objective Findings:  None Today  TREATMENT:     Therapeutic Exercise: (45 Minutes):  Exercises per grid below to improve mobility and strength. Required moderate visual, verbal and manual cues to promote proper body alignment, promote proper body posture and promote proper body mechanics. Progressed resistance, range and repetitions as indicated.      Date:  2022   Activity/Exercise Parameters   Quadriceps setting --   Quadriceps stretch --   IT band stretch 4 minutes   SLR --   Terminal knee extension (purple band) 4 minutes   Single LE squat handheld assist 6 minutes   Step ups 4 minutes   Shuttle Squats single and double 5 minutes   Sidelying hip abduction 3 x 10   clams --   2\" step downs --   Golfers lift --   Bridges with LE extensions 3 x 10   Single LE balance with twists --   stars 5 minutes   Deep squats 3 x 10   lunges -- Manual Therapy (    Soft Tissue Mobilization Duration  Duration: 10 Minutes): Manual techniques to facilitate improved motion and decreased pain. (Used abbreviations: MET - muscle energy technique; PNF - proprioceptive neuromuscular facilitation; NMR - neuromuscular re-education; a/p - anterior to posterior; p/a - posterior to anterior)   · Joint mobilization: patellofemoral all glides grade IV  · Soft tissue mobilization: L quadriceps, patellar tendon, gluteals  · Instrument assisted soft tissue mobilizaiton: L patellar tendon, L gastrocnemius. (NOT PERFORMED)    MedBridge Portal  Treatment/Session Summary:    · Response to Treatment:  Superior glide of patella continues to reproduce pain in the L lateral knee. Overall, mobility in the patellar region has improved. Pt. continues to tolerate increaed intensity of lower quarter strengthening with less knee pain. .  · Communication/Consultation:  None today  · Equipment provided today:  None today  · Recommendations/Intent for next treatment session: Next visit will focus on Manual therapy and quadriceps strengthening. Total Treatment Billable Duration:  55 minutes total time.     PT Patient Time In/Time Out  Time In: 0730  Time Out: 0830  José Miguel Cortés PT    Future Appointments   Date Time Provider Robin Leal   5/23/2022 10:30 AM Law Fuchs PT [facility]   6/13/2022 10:30 AM Law Fuchs PT [facility]   6/20/2022 10:30 AM Law Fuchs PT [facility]   6/27/2022 10:30 AM Law Fuchs PT [facility]

## 2022-05-23 ENCOUNTER — APPOINTMENT (OUTPATIENT)
Dept: PHYSICAL THERAPY | Age: 57
End: 2022-05-23
Payer: COMMERCIAL

## 2022-05-23 ENCOUNTER — HOSPITAL ENCOUNTER (OUTPATIENT)
Dept: PHYSICAL THERAPY | Age: 57
Setting detail: RECURRING SERIES
Discharge: HOME | End: 2022-05-26
Payer: COMMERCIAL

## 2022-05-23 PROCEDURE — 97140 MANUAL THERAPY 1/> REGIONS: CPT

## 2022-05-23 PROCEDURE — 97110 THERAPEUTIC EXERCISES: CPT

## 2022-05-23 ASSESSMENT — PAIN SCALES - GENERAL: PAINLEVEL_OUTOF10: 4

## 2022-05-23 NOTE — PROGRESS NOTES
Wendie Valdes  : 1965  Primary: [address] [facility]  Secondary:  [address] @ [address]  [address] [place], [place] [address]  Phone: [phone]  Fax: [phone] No data recorded  No data recorded    PT Visit Info:    No data recorded    OUTPATIENT PHYSICAL THERAPY:OP NOTE TYPE: Treatment Note 2022     Appt Desk   Episode      Treatment Diagnosis:  Patellar Tendonitis, L knee [M76.52]; Chondromalacia patellae, L knee [M22.42]  Medical/Referring Diagnosis:  Patellar tendinitis, unspecified knee [M76.50]  Chondromalacia patellae, left knee [M22.42]  Referring Physician:  Lashawn Peraza MD MD Orders:  PT Eval and Treat   Date of Onset:  Chronic  Allergies:  Patient has no allergy information on record. Restrictions/Precautions:   None  Interventions Planned (Treatment may consist of any combination of the following):    Current Treatment Recommendations: Strengthening; ROM; Functional mobility training; Stair training; Neuromuscular re-education; Manual Therapy - Joint Manipulation; Manual Therapy - Soft Tissue Mobilization; Therapeutic activities; Home exercise program; Endurance training     Subjective Comments: Pt. Reports improved knee pain this week. Initial:     4/10 Post Session:     4/10  Medications Last Reviewed:  2022  Updated Objective Findings:  None Today  Treatment   THERAPEUTIC EXERCISE: (40 minutes):    Exercises per grid below to improve mobility and strength. Required minimal visual, verbal and manual cues to promote proper body alignment, promote proper body posture and promote proper body mechanics. Progressed resistance, range and repetitions as indicated. MANUAL THERAPY: (15 minutes):   Joint mobilization and Soft tissue mobilization was utilized and necessary because of the patient's restricted joint motion, loss of articular motion and restricted motion of soft tissue.      · Soft tissue mobilization: L quadriceps  · Instrument assisted soft tissue mobilization: L patellar tendon  · Joint mobilization: patellofemoral glides, all glides grade III-IV     Date:  5/23/2022   Activity/Exercise Parameters   Shuttle unilateral LE press (4 band) 5 x 15   Golfers lift 3 x 10   bridges 3 x 10   Single LE squat handheld assist 4 x 10   Squats Eccentrics (22#) 3 x 10   Step downs (2\") 3 x 10   Resisted forward backward walking (cable 15#) 5 minutes   IT band stretch 4 minutes         Treatment/Session Summary:    · Treatment Assessment:  L superior patellar glides remain painful along the lateral patellar tendon region. Pt. Demonstrates signs of hip weakness with single LE squats today. · Communication/Consultation:  None today  · Equipment provided today:  None  · Recommendations/Intent for next treatment session: Next visit will focus on L LE strengthening.     Total Treatment Billable Duration:  55 minutes       Shawna Earl PT       Post Session Pain  Charge Capture  MedBridge Portal  MD Guidelines  Scanned Media  Benefits  MyChart

## 2022-06-09 NOTE — PROGRESS NOTES
I am accessing Mr. Wojciech James chart as a part of our department's internal chart auditing process. I certify that Mr. Rogers Trujillo is, or was, a patient in our department.   Thank you,  Lynnette Arellano, PT  6/9/2022

## 2022-06-13 ENCOUNTER — APPOINTMENT (OUTPATIENT)
Dept: PHYSICAL THERAPY | Age: 57
End: 2022-06-13

## 2022-06-13 ENCOUNTER — HOSPITAL ENCOUNTER (OUTPATIENT)
Dept: PHYSICAL THERAPY | Age: 57
Setting detail: RECURRING SERIES
Discharge: HOME | End: 2022-06-16
Payer: COMMERCIAL

## 2022-06-13 PROCEDURE — 97110 THERAPEUTIC EXERCISES: CPT

## 2022-06-13 PROCEDURE — 97140 MANUAL THERAPY 1/> REGIONS: CPT

## 2022-06-13 ASSESSMENT — PAIN SCALES - GENERAL: PAINLEVEL_OUTOF10: 3

## 2022-06-13 NOTE — PROGRESS NOTES
Virgilio Khan  : 1965  Primary: [address] [facility]  Secondary:  [address] @ [address]  [address] [place] [address]  Phone: [phone]  Fax: [phone] No data recorded  No data recorded    PT Visit Info:    No data recorded    OUTPATIENT PHYSICAL THERAPY:OP NOTE TYPE: Treatment Note 2022     Appt Desk   Episode      Treatment Diagnosis:  Patellar Tendonitis, L knee [M76.52]; Chondromalacia patellae, L knee [M22.42]  Medical/Referring Diagnosis:  Patellar tendinitis, unspecified knee [M76.50]  Chondromalacia patellae, left knee [M22.42]  Referring Physician:  Maria Luisa Ruiz MD MD Orders:  PT Eval and Treat   Date of Onset:  Chronic  Allergies:  Patient has no allergy information on record. Restrictions/Precautions:   None  Interventions Planned (Treatment may consist of any combination of the following):    Current Treatment Recommendations: Strengthening; ROM; Functional mobility training; Stair training; Neuromuscular re-education; Manual Therapy - Joint Manipulation; Manual Therapy - Soft Tissue Mobilization; Therapeutic activities; Home exercise program; Endurance training     Subjective Comments: Pt. Notes continued improvement in L knee pain. Pt. Notes pain is mostly present with lifting and pushing heavy objects. Initial:      3/10 Post Session:      3/10  Medications Last Reviewed:  2022  Updated Objective Findings:  Pain reproduced with superior patellar glide of L knee  Treatment   THERAPEUTIC EXERCISE: (40 minutes):    Exercises per grid below to improve mobility and strength. Required minimal visual, verbal and manual cues to promote proper body alignment, promote proper body posture and promote proper body mechanics. Progressed resistance, range and repetitions as indicated.      Date:  2022   Activity/Exercise Parameters   Shuttle unilateral LE press (4 band) 5 x 15   Golfers lift --   bridges 3 x 10   Single LE squat handheld assist 4 x 10   Squats Eccentrics (22#) 3 x 10   Step downs (2\") 3 x 10   Resisted forward backward walking (cable 15#) 5 minutes   IT band stretch 4 minutes   Lateral bounding 4 minutes   Lateral walking (green band) 4 minutes       MANUAL THERAPY: (15 minutes):   Joint mobilization and Soft tissue mobilization was utilized and necessary because of the patient's restricted joint motion, loss of articular motion and restricted motion of soft tissue. · Soft tissue mobilization: L quadriceps  · Instrument assisted soft tissue mobilization: L patellar tendon  · Joint mobilization: patellofemoral glides, all glides grade III-IV          Treatment/Session Summary:    · Treatment Assessment:  Pt. Continues to demonstrates reduced quadriceps strength on the L side compared to R side. Overall, patient is making good progress with strengthening exercises. · Communication/Consultation:  None today  · Equipment provided today:  None  · Recommendations/Intent for next treatment session: Next visit will focus on L LE strengthening.     Total Treatment Billable Duration:  55 minutes       Deledelroy Landeros PT       Post Session Pain  Charge Capture  [employer] Portal  MD Guidelines  Scanned Media  Benefits  MyChart

## 2022-06-13 NOTE — PROGRESS NOTES
Lucina Fothergill  : 1965  Primary: [address] [facility]  Secondary:  [address] @ [address]  [address] [place] [address]  Phone: [phone]  Fax: [phone] No data recorded  No data recorded    PT Visit Info:    No data recorded    OUTPATIENT PHYSICAL THERAPY:OP NOTE TYPE: Treatment Note 2022     Appt Desk   Episode      Treatment Diagnosis:  Patellar Tendonitis, L knee [M76.52]; Chondromalacia patellae, L knee [M22.42]  Medical/Referring Diagnosis:  Patellar tendinitis, unspecified knee [M76.50]  Chondromalacia patellae, left knee [M22.42]  Referring Physician:  Thierno Damon MD MD Orders:  PT Eval and Treat   Date of Onset:  Chronic  Allergies:  Patient has no allergy information on record. Restrictions/Precautions:   None  Interventions Planned (Treatment may consist of any combination of the following):    Current Treatment Recommendations: Strengthening; ROM; Functional mobility training; Stair training; Neuromuscular re-education; Manual Therapy - Joint Manipulation; Manual Therapy - Soft Tissue Mobilization; Therapeutic activities; Home exercise program; Endurance training     Subjective Comments: Pt. Notes L knee pain remains improved as long as he doesn't do too much heavy lifting or twisting. Initial:     3/10 Post Session:     3/10  Medications Last Reviewed:  2022  Updated Objective Findings:  Superior patellar glides reproduce pain today in L knee. Treatment   THERAPEUTIC EXERCISE: (40 minutes):    Exercises per grid below to improve mobility and strength. Required minimal visual, verbal and manual cues to promote proper body alignment, promote proper body posture and promote proper body mechanics. Progressed resistance, range and repetitions as indicated.      Date:  2022   Activity/Exercise Parameters   Shuttle unilateral LE press (4 band) 5 x 15   Golfers lift --   Bridges with LE extensions 3 x 10   Single LE squat handheld assist 4 x 10   Squats Eccentrics (22#) 3 x 10   Step downs (2\") 3 x 10   Resisted forward backward walking (cable 15#) 5 minutes   IT band stretch 4 minutes   Lateral bounding 4 minutes   Lateral walking with green band 4 minutes        MANUAL THERAPY: (15 minutes):   Joint mobilization and Soft tissue mobilization was utilized and necessary because of the patient's restricted joint motion, loss of articular motion and restricted motion of soft tissue. · Soft tissue mobilization: L quadriceps  · Instrument assisted soft tissue mobilization: L patellar tendon  · Joint mobilization: patellofemoral glides, all glides grade III-IV          Treatment/Session Summary:    · Treatment Assessment:  Pt. Is advanced with dynamic exercises today. L quadriceps strength and endurance is still limited compared to R side. Pt. Continues to demonstrate good progress with strengthening exercises to address remaining knee pain. · Communication/Consultation:  None today  · Equipment provided today:  None  · Recommendations/Intent for next treatment session: Next visit will focus on L LE strengthening.     Total Treatment Billable Duration:  55 minutes  Time In: 1030  Time Out: [address] Session Pain  Charge Capture  [employer] Portal  MD Guidelines  Scanned Media  Benefits  MyChart

## 2022-06-20 ENCOUNTER — APPOINTMENT (OUTPATIENT)
Dept: PHYSICAL THERAPY | Age: 57
End: 2022-06-20

## 2022-06-20 ENCOUNTER — HOSPITAL ENCOUNTER (OUTPATIENT)
Dept: PHYSICAL THERAPY | Age: 57
Setting detail: RECURRING SERIES
End: 2022-06-20
Payer: COMMERCIAL

## 2022-06-20 NOTE — PROGRESS NOTES
Stephen Oscar  : 1965  Primary: [address] [facility]  Secondary:  [address] @ [address] [address]  Phone: [phone]  Fax: [phone]       2022      Patient cancelled today's appointment and will plan to follow up at next scheduled visit.        Mica Gee, PT

## 2022-06-27 ENCOUNTER — APPOINTMENT (OUTPATIENT)
Dept: PHYSICAL THERAPY | Age: 57
End: 2022-06-27

## 2022-06-27 ENCOUNTER — HOSPITAL ENCOUNTER (OUTPATIENT)
Dept: PHYSICAL THERAPY | Age: 57
Setting detail: RECURRING SERIES
Discharge: HOME | End: 2022-06-30
Payer: COMMERCIAL

## 2022-06-27 PROCEDURE — 97110 THERAPEUTIC EXERCISES: CPT

## 2022-06-27 PROCEDURE — 97140 MANUAL THERAPY 1/> REGIONS: CPT

## 2022-06-27 ASSESSMENT — PAIN SCALES - GENERAL: PAINLEVEL_OUTOF10: 3

## 2022-06-27 NOTE — PROGRESS NOTES
Lindsey Munoz  : 1965  Primary: [address] [facility]  Secondary:  [address] @ [address] [place] [address]  Phone: [phone]  Fax: [phone] No data recorded  No data recorded    PT Visit Info:    No data recorded    OUTPATIENT PHYSICAL THERAPY:OP NOTE TYPE: Treatment Note 2022     Appt Desk   Episode      Treatment Diagnosis:  Patellar Tendonitis, L knee [M76.52]; Chondromalacia patellae, L knee [M22.42]  Medical/Referring Diagnosis:  Patellar tendinitis, unspecified knee [M76.50]  Chondromalacia patellae, left knee [M22.42]  Referring Physician:  Shawn Urrutia MD MD Orders:  PT Eval and Treat   Date of Onset:  Chronic  Allergies:  Patient has no allergy information on record. Restrictions/Precautions:   None  Interventions Planned (Treatment may consist of any combination of the following):    Current Treatment Recommendations: Strengthening; ROM; Functional mobility training; Stair training; Neuromuscular re-education; Manual Therapy - Joint Manipulation; Manual Therapy - Soft Tissue Mobilization; Therapeutic activities; Home exercise program; Endurance training     Subjective Comments: Pt. Notes continued improvement in L knee pain. Pt. Notes pain is mostly present with lifting and pushing heavy objects. Initial:     3/10 Post Session:     3/10  Medications Last Reviewed:  2022  Updated Objective Findings:  None Today  Treatment   THERAPEUTIC EXERCISE: (40 minutes):    Exercises per grid below to improve mobility and strength. Required minimal visual, verbal and manual cues to promote proper body alignment, promote proper body posture and promote proper body mechanics. Progressed resistance, range and repetitions as indicated.      Date:  2022   Activity/Exercise Parameters   Shuttle unilateral LE press (5 band) 5 x 15   Golfers lift --   Bridges with LE extension 3 x 10   Single LE squat handheld assist 4 x 10   Squats Eccentrics (22#) 3 x 10   Step downs (2\") --   Resisted forward backward walking (cable 15#) 5 minutes   IT band stretch 4 minutes   Lateral bounding 4 minutes   Lateral walking (green band) --   lunges 3 x 10   sidelying hip abduction 3 x 10       MANUAL THERAPY: (15 minutes):   Joint mobilization and Soft tissue mobilization was utilized and necessary because of the patient's restricted joint motion, loss of articular motion and restricted motion of soft tissue. · Soft tissue mobilization: L quadriceps  · Instrument assisted soft tissue mobilization: L patellar tendon  · Joint mobilization: patellofemoral glides, all glides grade III-IV          Treatment/Session Summary:    · Treatment Assessment:  Superior and medial patellar glides continues to reproduce L knee pain. Pain is not present during strengthening exercises and frequency of knee pain continues to improve with work related activities. · Communication/Consultation:  None today  · Equipment provided today:  None  · Recommendations/Intent for next treatment session: Next visit will focus on L LE strengthening.     Total Treatment Billable Duration:  55 minutes  Time In: 1030  Time Out: [address] Session Pain  Charge Capture  [employer] Portal  MD Guidelines  Scanned Media  Benefits  MyChart

## 2022-08-01 ENCOUNTER — HOSPITAL ENCOUNTER (OUTPATIENT)
Dept: PHYSICAL THERAPY | Age: 57
Setting detail: RECURRING SERIES
Discharge: HOME | End: 2022-08-04
Payer: COMMERCIAL

## 2022-08-01 PROCEDURE — 97140 MANUAL THERAPY 1/> REGIONS: CPT

## 2022-08-01 PROCEDURE — 97110 THERAPEUTIC EXERCISES: CPT

## 2022-08-01 ASSESSMENT — PAIN SCALES - GENERAL: PAINLEVEL_OUTOF10: 1

## 2022-08-01 NOTE — PROGRESS NOTES
Almas Raza  : 1965  Primary: Westley Zayas  Secondary:  [address] @ [address] [place] [address]  Phone: [phone]  Fax: [phone] No data recorded  No data recorded    PT Visit Info:    No data recorded    OUTPATIENT PHYSICAL THERAPY:OP NOTE TYPE: Treatment Note 2022     Appt Desk   Episode      Treatment Diagnosis:   Patellar Tendonitis, L knee [M76.52]; Chondromalacia patellae, L knee [M22.42]  Medical/Referring Diagnosis:  Patellar tendinitis, unspecified knee [M76.50]  Chondromalacia patellae, left knee [M22.42]  Referring Physician:  Rosa Woody MD MD Orders:  PT Eval and Treat   Date of Onset:  Chronic  Allergies:  Patient has no allergy information on record. Restrictions/Precautions:   None  Interventions Planned (Treatment may consist of any combination of the following):    Current Treatment Recommendations: Strengthening; ROM; Functional mobility training; Stair training; Neuromuscular re-education; Manual Therapy - Joint Manipulation; Manual Therapy - Soft Tissue Mobilization; Therapeutic activities; Home exercise program; Endurance training     Subjective Comments: Pt. Reports being out of town for most of July while traveling to [facility]. Pt. Notes L knee pain has been minimal over that time. Initial:     1/10 Post Session:     1/10  Medications Last Reviewed:  2022  Updated Objective Findings:   Hypomobility with medial patellar glides in the L patellar region  Treatment   THERAPEUTIC EXERCISE: (40 minutes):    Exercises per grid below to improve mobility and strength. Required minimal visual, verbal and manual cues to promote proper body alignment, promote proper body posture and promote proper body mechanics. Progressed resistance, range and repetitions as indicated.      Date:  2022   Activity/Exercise Parameters   Shuttle unilateral LE press (5 band) 5 x 15   Golfers lift 3 x 10   Bridges with LE extension 3 x 10 Single LE squat handheld assist --   Squats Eccentrics (22#) 3 x 10   Step downs (2\") --   Resisted forward backward walking (cable 15#) 5 minutes   IT band stretch 4 minutes   Lateral bounding --   Lateral walking (green band) --   lunges 3 x 10   sidelying hip abduction 3 x 10       MANUAL THERAPY: (15 minutes):   Joint mobilization and Soft tissue mobilization was utilized and necessary because of the patient's restricted joint motion, loss of articular motion and restricted motion of soft tissue. Soft tissue mobilization: L quadriceps  Instrument assisted soft tissue mobilization: L patellar tendon  Joint mobilization: patellofemoral glides, all glides grade III-IV          Treatment/Session Summary:    Treatment Assessment:  Pt. Does not complain of pain with patellar glides today but hypomobility remains present. Pt. Fatigues with lower quarter strengthening exercises today but has been absent from PT for a month. Communication/Consultation:  None today  Equipment provided today:  None  Recommendations/Intent for next treatment session: Next visit will focus on L LE strengthening.     Total Treatment Billable Duration:  55 minutes  Time In: 1030  Time Out: [address] Session Pain  Charge Capture  [employer] Portal  MD Guidelines  Scanned Media  Benefits  MyChart

## 2022-08-08 ENCOUNTER — HOSPITAL ENCOUNTER (OUTPATIENT)
Dept: PHYSICAL THERAPY | Age: 57
Setting detail: RECURRING SERIES
End: 2022-08-08
Payer: COMMERCIAL

## 2022-08-15 ENCOUNTER — HOSPITAL ENCOUNTER (OUTPATIENT)
Dept: PHYSICAL THERAPY | Age: 57
Setting detail: RECURRING SERIES
Discharge: HOME | End: 2022-08-18
Payer: COMMERCIAL

## 2022-08-15 PROCEDURE — 97110 THERAPEUTIC EXERCISES: CPT

## 2022-08-15 PROCEDURE — 97140 MANUAL THERAPY 1/> REGIONS: CPT

## 2022-08-15 ASSESSMENT — PAIN SCALES - GENERAL: PAINLEVEL_OUTOF10: 1

## 2022-08-15 NOTE — THERAPY RECERTIFICATION
Ana Lilia Domínguez  : 1965  Primary: Hima Kylee Sc  Secondary:  Harinder Resendez @ [place] [id number]  Phone: [phone]  Fax: [phone] Plan Frequency: 2x a week for 90 days  Plan of Care/Certification Expiration Date: 22    PT Visit Info: Total # of Visits to Date: 12      OUTPATIENT PHYSICAL THERAPY:OP NOTE TYPE: Recertification                Episode  Appt Desk         Treatment Diagnosis:  Patellar Tendonitis, L knee [M76.52]; Chondromalacia patellae, L knee [M22.42]  Medical/Referring Diagnosis:  Patellar tendinitis, unspecified knee [M76.50]  Chondromalacia patellae, left knee [M22.42]  Referring Physician:  Sayra Blanchard MD MD Orders:  PT Eval and Treat   Return MD Appt:  TBD  Date of Onset:  Chronic  Allergies:  Patient has no allergy information on record. Restrictions/Precautions:    Restrictions/Precautions: None  Medications Last Reviewed:  8/15/2022     SUBJECTIVE   Pt. Reports improved knee pain since starting PT. Patient Stated Goal(s): \"Improve knee pain\"  Initial:     1/10 Post Session:     1/10  Past Medical History/Comorbidities:   Mr. Tomasz Villeda  has a past medical history of Hypertriglyceridemia, IBS (irritable bowel syndrome), Vitamin B12 deficiency, and Vitamin D deficiency. Mr. Tomasz Villeda  has no past surgical history on file.           OBJECTIVE   UPDATED OBJECTIVE FINDINGS:                                      Right                                         Left  Quadriceps: -- --   Hamstrings -- --   Triceps Surae -- --   Patellar Tendon -- --   Pes Anserine -- --   Iliotibial Band -- --   Other:          Range of Motion:   degrees                              Right                                        Left  Knee Extension 6 5   Knee Flexion 138 136   Ankle Dorsiflexion 8 10      Strength:                                                Right                                        Left  Hip Extension 4+/5 4+/5   Hip Abduction 4+/5 4+/5   Knee Extension 5/5 5/5   Knee Flexion 5/5 5/5   Functional Squat Limited but no longer painful        Flexibility:  Iliopsoas: Hamstring: Quadriceps: limited   TFL: limited Calf:       ASSESSMENT   INITIAL ASSESSMENT:  Mr. Dani Navarrete presents with signs and symptoms of referring diagnosis. Pt. Sustained a patellar fracture over a year ago and has not been able to fully get over knee pain since injury. Pt. Experienced pain in the L patellar tendon with squatting activities and during palpation of the tendon. Mobility of the L anterior interval and patellofemoral joint is restricted compared to R side. Meniscus and ligamentous testing is negative. Pt. Demonstrates remaining quadriceps atrophy in the L thigh compared to R side. IT band tightness is also present in the L LE compared to R. Pt. Will benefit from manual therapy to address remaining pain and specific strengthening exercises to improve L quadriceps strength. Pt. Will benefit from skilled PT at this time. 5/9/22 Progress Report: Pt. Attends 10 physical therapy sessions. Pt. Demonstrates improved subjective L knee pain with daily activities and now reports a 3/10 pain with ADL's. The pain does increase with descending stairs, athletic activities, and pushing/pivoting on the L LE.  L quadriceps strength continues to improve with prescribed exercises but remains limited compared to R side. Pt. Is no longer tender to palpation of the patellar tendon. Pt. Will benefit from continued strengthening in the L hip and quadriceps to address remaining pain and limitations. 8/15/22 Progress Report: Pt. Attends 16 physical therapy sessions. Subjective complaints of L knee pain continue to improve since initial evaluation. Pt. Continues to experience pain in the patellar region with descending stairs and twisting forcefully through the L knee.   Strength and neuromuscular control in the L quadriceps musculature continues to improve with prescribed exercises. Atrophy is the L quadriceps is less noticeable compared to initial evaluation and patient requies less cueing for appropriate form during exercises. Pt. Will benefit from continued quadriceps strengthening to address remaining knee pain and prevent return of symptoms. Problem List: (Impacting functional limitations): Body Structures, Functions, Activity Limitations Requiring Skilled Therapeutic Intervention: Decreased ADL status; Decreased strength; Decreased endurance; Increased pain   Therapy Prognosis:   Therapy Prognosis: Good   Assessment Complexity:   Low Complexity  PLAN   Effective Dates: 8/15/2022 TO Plan of Care/Certification Expiration Date: 11/13/22   Frequency/Duration: Plan Frequency: 2x a week for 90 days   Interventions Planned (Treatment may consist of any combination of the following):    Current Treatment Recommendations: Strengthening; ROM; Functional mobility training; Stair training; Neuromuscular re-education; Manual Therapy - Joint Manipulation; Manual Therapy - Soft Tissue Mobilization; Therapeutic activities; Home exercise program; Endurance training     Goals: (Goals have been discussed and agreed upon with patient.)  Discharge Goals: Time Frame: 6 weeks  Pt. Will report < 4/10 L knee pain with standing, walking, lifting, squatting to improve symptomatic complaints. MET  Pt. Will report 0/10 L knee pain with descending stairs to improve functional mobility. ONGOING  Pt. Will complete 10 single LE squats without handheld assistance to improve lower quarter strength. ONGOING  Pt. Will be independent with HEP to prevent return of symptoms. ONGOING  Pt. Will score > 70 on the LEFS to demonstrate improved pain and function. ONGOING         Outcome Measure:  Tool Used: Lower Extremity Functional Scale (LEFS)  Score: Initial: 62/80 Most Recent: 57/80 (Date: 8/15/22 )   Interpretation of Score: 20 questions each scored on a 5 point scale with 0 representing \"extreme difficulty or unable to perform\" and 4 representing \"no difficulty\". The lower the score, the greater the functional disability. 80/80 represents no disability. Minimal detectable change is 9 points. Medical Necessity:   > Patient is expected to demonstrate progress in strength, range of motion, and balance to increase independence with ADL's and work activities. .  Reason For Services/Other Comments:  Patient will benefit from skilled PT to address impairments identified through evaluation and return to previous ADL and recreational capacity. Total Duration:       Regarding Darjose Gutting therapy, I certify that the treatment plan above will be carried out by a therapist or under their direction. Thank you for this referral,  Aman Saldivar, PT     Referring Physician Signature:  Ruth Black MD _______________________________ Date _____________        Post Session Pain  Charge Capture  PT Visit Info MD Cas Aguirre

## 2022-08-15 NOTE — PROGRESS NOTES
Freida Bowling  : 1965  Primary: Devyn Zayas  Secondary:  [address] @ [place] [id number]  Phone: [phone]  Fax: [phone] Plan Frequency: 2x a week for 90 days  Plan of Care/Certification Expiration Date: 22      PT Visit Info: Total # of Visits to Date: 12      OUTPATIENT PHYSICAL THERAPY:OP NOTE TYPE: Treatment Note 8/15/2022     Appt Desk   Episode      Treatment Diagnosis:   Patellar Tendonitis, L knee [M76.52]; Chondromalacia patellae, L knee [M22.42]  Medical/Referring Diagnosis:  Patellar tendinitis, unspecified knee [M76.50]  Chondromalacia patellae, left knee [M22.42]  Referring Physician:  Lucas Saravia MD MD Orders:  PT Eval and Treat   Date of Onset:  Chronic  Allergies:  Patient has no allergy information on record. Restrictions/Precautions:   None  Interventions Planned (Treatment may consist of any combination of the following):    Current Treatment Recommendations: Strengthening; ROM; Functional mobility training; Stair training; Neuromuscular re-education; Manual Therapy - Joint Manipulation; Manual Therapy - Soft Tissue Mobilization; Therapeutic activities; Home exercise program; Endurance training     Subjective Comments: Pt. Notes f/u with referring MD whom recommended to continue with PT and f/u in 6 months. Initial:     1/10 Post Session:     1/10  Medications Last Reviewed:  8/15/2022  Updated Objective Findings:  See evaluation note from today  Treatment   THERAPEUTIC EXERCISE: (40 minutes):    Exercises per grid below to improve mobility and strength. Required minimal visual, verbal and manual cues to promote proper body alignment, promote proper body posture and promote proper body mechanics. Progressed resistance, range and repetitions as indicated.      Date:  2022   Activity/Exercise Parameters   Shuttle unilateral LE press (5 band) 5 x 15   Golfers lift 3 x 10   Bridges with LE extension 3 x 10 Single LE squat handheld assist 3 x 10   Squats Eccentrics (40#) 3 x 10   Step downs (2\") --   Resisted forward backward walking (cable 15#) 5 minutes   IT band stretch 4 minutes   Lateral bounding --   Lateral walking (green band) --   lunges --   sidelying hip abduction 3 x 10       MANUAL THERAPY: (15 minutes):   Joint mobilization and Soft tissue mobilization was utilized and necessary because of the patient's restricted joint motion, loss of articular motion and restricted motion of soft tissue. Soft tissue mobilization: L quadriceps  Instrument assisted soft tissue mobilization: L patellar tendon  Joint mobilization: patellofemoral glides, all glides grade III-IV          Treatment/Session Summary:    Treatment Assessment:  Pt. Experiences mild pain in the L patellar region with squatting activities today. Superior patellar glides cause pain along the patellar tendon. Pt. Will benefit from increased frequency of strengthening exercises for the L quadriceps musculature. Communication/Consultation:  None today  Equipment provided today:  None  Recommendations/Intent for next treatment session: Next visit will focus on L LE strengthening.     Total Treatment Billable Duration:  55 minutes       Oscar Sun PT       Post Session Pain  Charge Capture  MedBridge Portal  MD Guidelines  Scanned Media  Benefits  MyChart

## 2022-08-29 ENCOUNTER — HOSPITAL ENCOUNTER (OUTPATIENT)
Dept: PHYSICAL THERAPY | Age: 57
Setting detail: RECURRING SERIES
Discharge: HOME | End: 2022-09-01
Payer: COMMERCIAL

## 2022-08-29 PROCEDURE — 97110 THERAPEUTIC EXERCISES: CPT

## 2022-08-29 PROCEDURE — 97140 MANUAL THERAPY 1/> REGIONS: CPT

## 2022-08-29 ASSESSMENT — PAIN SCALES - GENERAL: PAINLEVEL_OUTOF10: 1

## 2022-08-29 NOTE — PROGRESS NOTES
Stephen Arcadiocasandra  : 1965  Primary: Jordan Dickson Sc  Secondary:  [address] @ [facility] [address]  Phone: [phone]  Fax: [phone] Plan Frequency: 2x a week for 90 days  Plan of Care/Certification Expiration Date: 22      PT Visit Info: Total # of Visits to Date: 16      OUTPATIENT PHYSICAL THERAPY:OP NOTE TYPE: Treatment Note 2022     Appt Desk   Episode      Treatment Diagnosis:   Patellar Tendonitis, L knee [M76.52]; Chondromalacia patellae, L knee [M22.42]  Medical/Referring Diagnosis:  Patellar tendinitis, unspecified knee [M76.50]  Chondromalacia patellae, left knee [M22.42]  Referring Physician:  Callum Taylor MD MD Orders:  PT Eval and Treat   Date of Onset:  Chronic  Allergies:  Patient has no allergy information on record. Restrictions/Precautions:   None  Interventions Planned (Treatment may consist of any combination of the following):    Current Treatment Recommendations: Strengthening; ROM; Functional mobility training; Stair training; Neuromuscular re-education; Manual Therapy - Joint Manipulation; Manual Therapy - Soft Tissue Mobilization; Therapeutic activities; Home exercise program; Endurance training     Subjective Comments: Pt. Reports a few incidents at work in which he experienced sharp knee pain. Pain is usually associated with quick turns on the L knee. Initial:     1/10 Post Session:     1/10  Medications Last Reviewed:  2022  Updated Objective Findings:  None Today  Treatment   THERAPEUTIC EXERCISE: (40 minutes):    Exercises per grid below to improve mobility and strength. Required minimal visual, verbal and manual cues to promote proper body alignment, promote proper body posture and promote proper body mechanics. Progressed resistance, range and repetitions as indicated.      Date:  2022   Activity/Exercise Parameters   Shuttle unilateral LE press (5 band) 5 x 15   Golfers lift --   Bridges with LE extension --   Single LE squat handheld assist 3 x 10   Squats Eccentrics (40#) 3 x 10   Step downs (2\") 4 minutes   Resisted forward backward walking (cable 15#) --   IT band stretch 4 minutes   Lateral bounding 4 minutes   Lateral walking (green band) --   lunges --   sidelying hip abduction 3 x 10   Leg press (unilateral) 60# 3 x 10   Stairs 5 minutes           MANUAL THERAPY: (15 minutes):   Joint mobilization and Soft tissue mobilization was utilized and necessary because of the patient's restricted joint motion, loss of articular motion and restricted motion of soft tissue. Soft tissue mobilization: L quadriceps  Instrument assisted soft tissue mobilization: L patellar tendon  Joint mobilization: patellofemoral glides, all glides grade III-IV          Treatment/Session Summary:    Treatment Assessment:  L quadriceps control with stair descent remains challenged. Pt. Will benefit from continued quadriceps strengthening on the L side to improve stair performance and prevent knee pain. Communication/Consultation:  None today  Equipment provided today:  None  Recommendations/Intent for next treatment session: Next visit will focus on L LE strengthening.     Total Treatment Billable Duration:  55 minutes  Time In: 1030  Time Out: [address] Session Pain  Charge Capture  [employer] Portal  MD Guidelines  Scanned Media  Benefits  MyChart

## 2022-09-12 ENCOUNTER — HOSPITAL ENCOUNTER (OUTPATIENT)
Dept: PHYSICAL THERAPY | Age: 57
Setting detail: RECURRING SERIES
Discharge: HOME | End: 2022-09-15
Payer: COMMERCIAL

## 2022-09-12 PROCEDURE — 97110 THERAPEUTIC EXERCISES: CPT

## 2022-09-12 PROCEDURE — 97140 MANUAL THERAPY 1/> REGIONS: CPT

## 2022-09-12 ASSESSMENT — PAIN SCALES - GENERAL: PAINLEVEL_OUTOF10: 1

## 2022-09-12 NOTE — PROGRESS NOTES
Brandyn Solomon  : 1965  Primary: [place] Sc  Secondary:  [address] @ [place] [id number]  Phone: [phone]  Fax: [phone] Plan Frequency: 2x a week for 90 days  Plan of Care/Certification Expiration Date: 22      PT Visit Info: Total # of Visits to Date: 25      OUTPATIENT PHYSICAL THERAPY:OP NOTE TYPE: Treatment Note 2022     Appt Desk   Episode      Treatment Diagnosis:   Patellar Tendonitis, L knee [M76.52]; Chondromalacia patellae, L knee [M22.42]  Medical/Referring Diagnosis:  Patellar tendinitis, unspecified knee [M76.50]  Chondromalacia patellae, left knee [M22.42]  Referring Physician:  Luna Molina MD MD Orders:  PT Eval and Treat   Date of Onset:  Chronic  Allergies:  Patient has no allergy information on record. Restrictions/Precautions:   None  Interventions Planned (Treatment may consist of any combination of the following):    Current Treatment Recommendations: Strengthening; ROM; Functional mobility training; Stair training; Neuromuscular re-education; Manual Therapy - Joint Manipulation; Manual Therapy - Soft Tissue Mobilization; Therapeutic activities; Home exercise program; Endurance training     Subjective Comments: Pt. Notes increased clicking in the L knee over the past week. Clicking is mostly present after sitting for long periods then going to walk. Initial:     1/10 Post Session:     1/10  Medications Last Reviewed:  2022  Updated Objective Findings:   Patellar glides normal mobility without pain today. Treatment   THERAPEUTIC EXERCISE: (40 minutes):    Exercises per grid below to improve mobility and strength. Required minimal visual, verbal and manual cues to promote proper body alignment, promote proper body posture and promote proper body mechanics. Progressed resistance, range and repetitions as indicated.      Date:  2022   Activity/Exercise Parameters   Shuttle unilateral LE press (5 band) 5 x 15   Golfers lift --   Bridges with LE extension 3 x 10   Single LE squat handheld assist 3 x 10   Squats Eccentrics (40#) --   Step downs (2\") 4 minutes   Resisted forward backward walking (cable 15#) --   IT band stretch 4 minutes   Lateral bounding --   Lateral walking (green band) --   lunges --   sidelying hip abduction 3 x 10   Leg press (unilateral) 60# 3 x 10   Stairs --   Deadlift (25#) 6 minutes       MANUAL THERAPY: (15 minutes):   Joint mobilization and Soft tissue mobilization was utilized and necessary because of the patient's restricted joint motion, loss of articular motion and restricted motion of soft tissue. Soft tissue mobilization: L quadriceps  Instrument assisted soft tissue mobilization: L patellar tendon  Joint mobilization: patellofemoral glides, all glides grade III-IV          Treatment/Session Summary:    Treatment Assessment:  L quadriceps strength and endurance remains limited compared to R side as evidenced with single LE squats. Pt. Will benefit from increased strengthening at home to improve quadriceps strengthening. Communication/Consultation:  None today  Equipment provided today:  None  Recommendations/Intent for next treatment session: Next visit will focus on L LE strengthening.     Total Treatment Billable Duration:  55 minutes  Time In: 1030  Time Out: [address] Session Pain  Charge Capture  [facility] Portal  MD Guidelines  Scanned Media  Benefits  MyChart

## 2022-09-19 ENCOUNTER — HOSPITAL ENCOUNTER (OUTPATIENT)
Dept: PHYSICAL THERAPY | Age: 57
Setting detail: RECURRING SERIES
End: 2022-09-19
Payer: COMMERCIAL

## 2022-09-19 NOTE — PROGRESS NOTES
Shira Ervin  : 1965  Primary: Chayo Zayas  Secondary:  Guanakito Devan @ [address] [place] [address]  Phone: [phone]  Fax: [phone]       2022      Patient did not show up to today's appointment and will plan to follow up at next scheduled visit.        Irvin Vo, PT

## 2022-09-26 ENCOUNTER — HOSPITAL ENCOUNTER (OUTPATIENT)
Dept: PHYSICAL THERAPY | Age: 57
Setting detail: RECURRING SERIES
Discharge: HOME | End: 2022-09-29
Payer: COMMERCIAL

## 2022-09-26 PROCEDURE — 97110 THERAPEUTIC EXERCISES: CPT

## 2022-09-26 PROCEDURE — 97140 MANUAL THERAPY 1/> REGIONS: CPT

## 2022-09-26 ASSESSMENT — PAIN SCALES - GENERAL: PAINLEVEL_OUTOF10: 1

## 2022-09-26 NOTE — PROGRESS NOTES
Freida Bowling  : 1965  Primary: Devyn Zayas  Secondary:  [address] @ [facility] [address]  Phone: [phone]  Fax: [phone] Plan Frequency: 2x a week for 90 days  Plan of Care/Certification Expiration Date: 22      PT Visit Info: Total # of Visits to Date: 23      OUTPATIENT PHYSICAL THERAPY:OP NOTE TYPE: Treatment Note 2022     Appt Desk   Episode      Treatment Diagnosis:   Patellar Tendonitis, L knee [M76.52]; Chondromalacia patellae, L knee [M22.42]  Medical/Referring Diagnosis:  Patellar tendinitis, unspecified knee [M76.50]  Chondromalacia patellae, left knee [M22.42]  Referring Physician:  Lucas Saravia MD MD Orders:  PT Eval and Treat   Date of Onset:  Chronic  Allergies:  Patient has no allergy information on record. Restrictions/Precautions:   None  Interventions Planned (Treatment may consist of any combination of the following):    Current Treatment Recommendations: Strengthening; ROM; Functional mobility training; Stair training; Neuromuscular re-education; Manual Therapy - Joint Manipulation; Manual Therapy - Soft Tissue Mobilization; Therapeutic activities; Home exercise program; Endurance training     Subjective Comments: Pt. Reports missing last PT session secondary to coinciding doctors appointment. Initial:     1/10 Post Session:     1/10  Medications Last Reviewed:  2022  Updated Objective Findings:  None Today  Treatment   THERAPEUTIC EXERCISE: (40 minutes):    Exercises per grid below to improve mobility and strength. Required minimal visual, verbal and manual cues to promote proper body alignment, promote proper body posture and promote proper body mechanics. Progressed resistance, range and repetitions as indicated.      Date:  2022   Activity/Exercise Parameters   Shuttle unilateral LE press (5 band) 5 x 15   Golfers lift 3 x 10   Bridges with LE extension 3 x 10   Single LE squat handheld assist 3 x 10   Squats Eccentrics (40#) 3 x 10   Step downs (2\") 4 minutes   Resisted forward backward walking (cable 15#) --   IT band stretch 4 minutes   Lateral bounding --   Lateral walking (green band) --   lunges 3 x 5   sidelying hip abduction 3 x 10   Leg press (unilateral) 60# 3 x 10   Stairs --   Deadlift (25#) --       MANUAL THERAPY: (15 minutes):   Joint mobilization and Soft tissue mobilization was utilized and necessary because of the patient's restricted joint motion, loss of articular motion and restricted motion of soft tissue. Soft tissue mobilization: L quadriceps  Instrument assisted soft tissue mobilization: L patellar tendon  Joint mobilization: patellofemoral glides, all glides grade III-IV          Treatment/Session Summary:    Treatment Assessment:  Pt. Continues to require assistance with elevated single LE squats. L quadriceps strength and endurance remains limited compared to R side. Communication/Consultation:  None today  Equipment provided today:  None  Recommendations/Intent for next treatment session: Next visit will focus on L LE strengthening.     Total Treatment Billable Duration:  55 minutes  Time In: 1030  Time Out: [address] Session Pain  Charge Capture  MedBridge Portal  MD Guidelines  Scanned Media  Benefits  MyChart

## 2022-10-03 ENCOUNTER — HOSPITAL ENCOUNTER (OUTPATIENT)
Dept: PHYSICAL THERAPY | Age: 57
Setting detail: RECURRING SERIES
Discharge: HOME | End: 2022-10-06
Payer: COMMERCIAL

## 2022-10-03 PROCEDURE — 97110 THERAPEUTIC EXERCISES: CPT

## 2022-10-03 PROCEDURE — 97140 MANUAL THERAPY 1/> REGIONS: CPT

## 2022-10-03 ASSESSMENT — PAIN SCALES - GENERAL: PAINLEVEL_OUTOF10: 3

## 2022-10-03 NOTE — PROGRESS NOTES
Sagar Calvin  : 1965  Primary: Ximena Michaelrajani Zayas  Secondary:  [address] @ [facility]  [place] [id number]  Phone: [phone]  Fax: [phone] Plan Frequency: 2x a week for 90 days  Plan of Care/Certification Expiration Date: 22      PT Visit Info: Total # of Visits to Date: 21      OUTPATIENT PHYSICAL THERAPY:OP NOTE TYPE: Treatment Note 10/3/2022     Appt Desk   Episode      Treatment Diagnosis:   Patellar Tendonitis, L knee [M76.52]; Chondromalacia patellae, L knee [M22.42]  Medical/Referring Diagnosis:  Patellar tendinitis, unspecified knee [M76.50]  Chondromalacia patellae, left knee [M22.42]  Referring Physician:  Medardo Pascal MD MD Orders:  PT Eval and Treat   Date of Onset:  Chronic  Allergies:  Patient has no allergy information on record. Restrictions/Precautions:   None  Interventions Planned (Treatment may consist of any combination of the following):    Current Treatment Recommendations: Strengthening; ROM; Functional mobility training; Stair training; Neuromuscular re-education; Manual Therapy - Joint Manipulation; Manual Therapy - Soft Tissue Mobilization; Therapeutic activities; Home exercise program; Endurance training     Subjective Comments: Pt. Notes increased anterior knee pain and some swelling last week after PT and increased load at work. The pain has improved, but worse than it was last week. Initial:     3/10 Post Session:     3/10  Medications Last Reviewed:  10/3/2022  Updated Objective Findings:   tender to palpation of the L patellar tendon. Tightness present in the IT band  Treatment   THERAPEUTIC EXERCISE: (40 minutes):    Exercises per grid below to improve mobility and strength. Required minimal visual, verbal and manual cues to promote proper body alignment, promote proper body posture and promote proper body mechanics. Progressed resistance, range and repetitions as indicated.      Date:  2022 Activity/Exercise Parameters   Shuttle unilateral LE press (5 band) 5 x 15   Golfers lift --   Bridges with LE extension 3 x 10   Single LE squat handheld assist 3 x 10   Squats Eccentrics (22#) 3 x 10   Step downs (2\") 3 x 10   Resisted forward backward walking (cable 15#) --   IT band stretch 4 minutes   Lateral bounding 4 minutes   Lateral walking (green band) --   lunges --   sidelying hip abduction 3 x 10   Leg press (unilateral) 60# --   Stairs --   TKE purple band 4 minutes   Deadlift (25#) --       MANUAL THERAPY: (15 minutes):   Joint mobilization and Soft tissue mobilization was utilized and necessary because of the patient's restricted joint motion, loss of articular motion and restricted motion of soft tissue. Soft tissue mobilization: L quadriceps  Instrument assisted soft tissue mobilization: L patellar tendon, calf  Joint mobilization: patellofemoral glides, all glides grade III-IV          Treatment/Session Summary:    Treatment Assessment:  Pt. Demonstrates signs of a return of patellar tendonitis this week. Pain is located along the patellar tendon with palpation. Some intensity of exercise is reduced secondary to increased pain this week. Communication/Consultation:  None today  Equipment provided today:  None  Recommendations/Intent for next treatment session: Next visit will focus on L LE strengthening.     Total Treatment Billable Duration:  55 minutes  Time In: 0930  Time Out: [address] Session Pain  Charge Capture  MedBridge Portal  MD Guidelines  Scanned Media  Benefits  MyChart

## 2022-10-10 ENCOUNTER — HOSPITAL ENCOUNTER (OUTPATIENT)
Dept: PHYSICAL THERAPY | Age: 57
Setting detail: RECURRING SERIES
Discharge: HOME | End: 2022-10-13
Payer: COMMERCIAL

## 2022-10-10 PROCEDURE — 97110 THERAPEUTIC EXERCISES: CPT

## 2022-10-10 PROCEDURE — 97140 MANUAL THERAPY 1/> REGIONS: CPT

## 2022-10-10 ASSESSMENT — PAIN SCALES - GENERAL: PAINLEVEL_OUTOF10: 3

## 2022-10-10 NOTE — PROGRESS NOTES
Karol Reason  : 1965  Primary: Guanakito Galan Sc  Secondary:  [address] [facility] @ [place]  [facility] [address]  Phone: [phone]  Fax: [phone] Plan Frequency: 2x a week for 90 days  Plan of Care/Certification Expiration Date: 22      PT Visit Info: Total # of Visits to Date: 24      OUTPATIENT PHYSICAL THERAPY:OP NOTE TYPE: Treatment Note 10/10/2022     Appt Desk   Episode      Treatment Diagnosis:   Patellar Tendonitis, L knee [M76.52]; Chondromalacia patellae, L knee [M22.42]  Medical/Referring Diagnosis:  Patellar tendinitis, unspecified knee [M76.50]  Chondromalacia patellae, left knee [M22.42]  Referring Physician:  Panchito Sandoval MD MD Orders:  PT Eval and Treat   Date of Onset:  Chronic  Allergies:  Patient has no allergy information on record. Restrictions/Precautions:   None  Interventions Planned (Treatment may consist of any combination of the following):    Current Treatment Recommendations: Strengthening; ROM; Functional mobility training; Stair training; Neuromuscular re-education; Manual Therapy - Joint Manipulation; Manual Therapy - Soft Tissue Mobilization; Therapeutic activities; Home exercise program; Endurance training     Subjective Comments: Pt. Reports L knee pain improved following last PT session. Initial:     3/10 Post Session:     3/10  Medications Last Reviewed:  10/10/2022  Updated Objective Findings:  None Today  Treatment   THERAPEUTIC EXERCISE: (40 minutes):    Exercises per grid below to improve mobility and strength. Required minimal visual, verbal and manual cues to promote proper body alignment, promote proper body posture and promote proper body mechanics. Progressed resistance, range and repetitions as indicated.      Date:  2022   Activity/Exercise Parameters   Shuttle unilateral LE press (5 band) 5 x 15   Golfers lift 3 x 10   Bridges with LE extension --   Single LE squat handheld assist 3 x 10   Squats Eccentrics (50#) 3 x 10   Step downs (2\") --   Resisted forward backward walking (cable 15#) --   IT band stretch 4 minutes   Lateral bounding --   Lateral walking (green band) 4 minutes   lunges --   sidelying hip abduction 3 x 10   Leg press (unilateral) 60# --   Stairs --   TKE purple band 4 minutes   Deadlift (25#) 3 x 10       MANUAL THERAPY: (15 minutes):   Joint mobilization and Soft tissue mobilization was utilized and necessary because of the patient's restricted joint motion, loss of articular motion and restricted motion of soft tissue. Soft tissue mobilization: L quadriceps  Instrument assisted soft tissue mobilization: L patellar tendon, calf  Joint mobilization: patellofemoral glides, all glides grade III-IV          Treatment/Session Summary:    Treatment Assessment:  Pain in the L knee is improved with most exercise except single LE squat on the L side. Pt. Continues to require cueing for appropriate form during single LE squat. Communication/Consultation:  None today  Equipment provided today:  None  Recommendations/Intent for next treatment session: Next visit will focus on L LE strengthening.     Total Treatment Billable Duration:  55 minutes  Time In: 0930  Time Out: [address] Session Pain  Charge Capture  [employer] Portal  MD Guidelines  Scanned Media  Benefits  MyChart

## 2022-10-24 ENCOUNTER — HOSPITAL ENCOUNTER (OUTPATIENT)
Dept: PHYSICAL THERAPY | Age: 57
Setting detail: RECURRING SERIES
Discharge: HOME | End: 2022-10-27
Payer: COMMERCIAL

## 2022-10-24 PROCEDURE — 97110 THERAPEUTIC EXERCISES: CPT

## 2022-10-24 PROCEDURE — 97140 MANUAL THERAPY 1/> REGIONS: CPT

## 2022-10-24 ASSESSMENT — PAIN SCALES - GENERAL: PAINLEVEL_OUTOF10: 3

## 2022-10-24 NOTE — PROGRESS NOTES
Rowena Fess  : 1965  Primary: Suzy Buerger Sc  Secondary:  [address] @ [facility] [address]  Phone: [phone]  Fax: [phone] Plan Frequency: 2x a week for 90 days  Plan of Care/Certification Expiration Date: 22      PT Visit Info: Total # of Visits to Date: 25      OUTPATIENT PHYSICAL THERAPY:OP NOTE TYPE: Treatment Note 10/24/2022     Appt Desk   Episode      Treatment Diagnosis:   Patellar Tendonitis, L knee [M76.52]; Chondromalacia patellae, L knee [M22.42]  Medical/Referring Diagnosis:  Patellar tendinitis, unspecified knee [M76.50]  Chondromalacia patellae, left knee [M22.42]  Referring Physician:  Mariel Palmer MD MD Orders:  PT Eval and Treat   Date of Onset:  Chronic  Allergies:  Patient has no allergy information on record. Restrictions/Precautions:   None  Interventions Planned (Treatment may consist of any combination of the following):    Current Treatment Recommendations: Strengthening; ROM; Functional mobility training; Stair training; Neuromuscular re-education; Manual Therapy - Joint Manipulation; Manual Therapy - Soft Tissue Mobilization; Therapeutic activities; Home exercise program; Endurance training     Subjective Comments: Pt. Notes stiffness in the L knee after sitting for a while then going to stand and walk. Initial:     3/10 Post Session:     3/10  Medications Last Reviewed:  10/24/2022  Updated Objective Findings:   Superior patellar glide on L side is painful. Tightness in the L IT band present  Treatment   THERAPEUTIC EXERCISE: (40 minutes):    Exercises per grid below to improve mobility and strength. Required minimal visual, verbal and manual cues to promote proper body alignment, promote proper body posture and promote proper body mechanics. Progressed resistance, range and repetitions as indicated.      Date:  2022   Activity/Exercise Parameters   Shuttle unilateral LE press (5 band) -- Golfers lift --   Bridges with LE extension --   Single LE squat handheld assist 3 x 10   Squats Eccentrics (50#) 4 x 10   Step downs (2\") 3 x 10   Resisted forward backward walking (cable 15#) --   IT band stretch 4 minutes   Lateral bounding --   Lateral walking (green band) 4 minutes   lunges --   sidelying hip abduction 3 x 10   Leg press (unilateral) 60# --   Stairs --   TKE purple band 4 minutes   Deadlift (25#) 3 x 10   Warrior pose 4 minutes       MANUAL THERAPY: (15 minutes):   Joint mobilization and Soft tissue mobilization was utilized and necessary because of the patient's restricted joint motion, loss of articular motion and restricted motion of soft tissue. Soft tissue mobilization: L quadriceps  Instrument assisted soft tissue mobilization: L patellar tendon, calf  Joint mobilization: patellofemoral glides, all glides grade III-IV          Treatment/Session Summary:    Treatment Assessment:  Tightness in the L IT band seems to contribute to L sided patellar pain. Pt. Continues to demonstrates L quadriceps weakness compared to R side as evidenced with single LE squat exercises. Communication/Consultation:  None today  Equipment provided today:  None  Recommendations/Intent for next treatment session: Next visit will focus on L LE strengthening.     Total Treatment Billable Duration:  55 minutes  Time In: 1030  Time Out: [address] Session Pain  Charge Capture  [facility] Portal  MD Guidelines  Scanned Media  Benefits  MyChart

## 2022-11-07 ENCOUNTER — HOSPITAL ENCOUNTER (OUTPATIENT)
Dept: PHYSICAL THERAPY | Age: 57
Setting detail: RECURRING SERIES
End: 2022-11-07
Payer: COMMERCIAL

## 2022-11-07 NOTE — PROGRESS NOTES
Shira Ervin  : 1965  Primary: Chayo Zayas  Secondary:  [address] @ [address] [id number]  Phone: [phone]  Fax: [phone]       2022      Patient cancelled today's appointment and will plan to follow up at next scheduled visit.        Irvin Vo, PT

## 2022-11-21 ENCOUNTER — HOSPITAL ENCOUNTER (OUTPATIENT)
Dept: PHYSICAL THERAPY | Age: 57
Setting detail: RECURRING SERIES
Discharge: HOME | End: 2022-11-24
Payer: COMMERCIAL

## 2022-11-21 PROCEDURE — 97110 THERAPEUTIC EXERCISES: CPT

## 2022-11-21 PROCEDURE — 97140 MANUAL THERAPY 1/> REGIONS: CPT

## 2022-11-21 ASSESSMENT — PAIN SCALES - GENERAL: PAINLEVEL_OUTOF10: 3

## 2022-11-21 NOTE — THERAPY RECERTIFICATION
Ysabel Aretha  : 1965  Primary: Reji Prairie Sc  Secondary:  Nevin Gavin @ [facility] [address]  Phone: [phone]  Fax: [phone] Plan Frequency: 2x a week for 90 days  Plan of Care/Certification Expiration Date: 23      PT Visit Info: Total # of Visits to Date: 21        OUTPATIENT PHYSICAL THERAPY:OP NOTE TYPE: Recertification                Episode  Appt Desk         Treatment Diagnosis:  Patellar Tendonitis, L knee [M76.52]; Chondromalacia patellae, L knee [M22.42]  Medical/Referring Diagnosis:  Patellar tendinitis, unspecified knee [M76.50]  Chondromalacia patellae, left knee [M22.42]  Referring Physician:  Portia Chance MD MD Orders:  PT Eval and Treat   Return MD Appt:  TBD  Date of Onset:  Chronic  Allergies:  Patient has no allergy information on record. Restrictions/Precautions:    Restrictions/Precautions: None  Medications Last Reviewed:  2022     SUBJECTIVE   Pt. Notes knee pain remains improved with occasional pain descending stairs in the L knee and with certain heavy duty activities at work. Pt. Also notes the knee seems unsteady at times with walking. Patient Stated Goal(s): \"Improve knee pain\"  Initial:     3/10 Post Session:     3/10  Past Medical History/Comorbidities:   Mr. Adama Ocampo  has a past medical history of Hypertriglyceridemia, IBS (irritable bowel syndrome), Vitamin B12 deficiency, and Vitamin D deficiency. Mr. Adama Ocampo  has no past surgical history on file.           OBJECTIVE   UPDATED OBJECTIVE FINDINGS:                                      Right                                         Left  Quadriceps: -- --   Hamstrings -- --   Triceps Surae -- --   Patellar Tendon -- --   Pes Anserine -- --   Iliotibial Band -- --   Other:          Range of Motion:   degrees                              Right                                        Left  Knee Extension 6 5   Knee Flexion 138 136   Ankle Dorsiflexion 8 10      Strength:                                                Right                                        Left  Hip Extension 5/5 5/5   Hip Abduction 4+/5 4+/5   Knee Extension 5/5 5/5   Knee Flexion 5/5 5/5   Functional Squat Limited but no longer painful        Flexibility:  Iliopsoas: Hamstring: Quadriceps: limited   TFL: limited Calf:       ASSESSMENT   INITIAL ASSESSMENT:  Mr. Anjelica Senior presents with signs and symptoms of referring diagnosis. Pt. Sustained a patellar fracture over a year ago and has not been able to fully get over knee pain since injury. Pt. Experienced pain in the L patellar tendon with squatting activities and during palpation of the tendon. Mobility of the L anterior interval and patellofemoral joint is restricted compared to R side. Meniscus and ligamentous testing is negative. Pt. Demonstrates remaining quadriceps atrophy in the L thigh compared to R side. IT band tightness is also present in the L LE compared to R. Pt. Will benefit from manual therapy to address remaining pain and specific strengthening exercises to improve L quadriceps strength. Pt. Will benefit from skilled PT at this time. 5/9/22 Progress Report: Pt. Attends 10 physical therapy sessions. Pt. Demonstrates improved subjective L knee pain with daily activities and now reports a 3/10 pain with ADL's. The pain does increase with descending stairs, athletic activities, and pushing/pivoting on the L LE.  L quadriceps strength continues to improve with prescribed exercises but remains limited compared to R side. Pt. Is no longer tender to palpation of the patellar tendon. Pt. Will benefit from continued strengthening in the L hip and quadriceps to address remaining pain and limitations. 8/15/22 Progress Report: Pt. Attends 16 physical therapy sessions. Subjective complaints of L knee pain continue to improve since initial evaluation.   Pt. Continues to experience pain in the patellar region with descending stairs and twisting forcefully through the L knee. Strength and neuromuscular control in the L quadriceps musculature continues to improve with prescribed exercises. Atrophy is the L quadriceps is less noticeable compared to initial evaluation and patient requies less cueing for appropriate form during exercises. Pt. Will benefit from continued quadriceps strengthening to address remaining knee pain and prevent return of symptoms. 11/22/22 Progress Report: Pt. Attends 23 physical therapy sessions. Pt. Is know able to descend stairs in a normal pattern with only mild L anterior knee pain. Subjective pain complaints continue to improve with ADL and work activities. L quadriceps strength and neuromuscular control remains limited compared to R side. Pt. Is unable to complete single LE squat on the L side secondary to anterior knee pain. Mild tightness is also present in the L iliotibial band contributing to patellofemoral pain. Pt. Continues to work on HEP and will benefit from gradual advancement of quadriceps strengthening program as tolerated to address remaining goals. Problem List: (Impacting functional limitations): Body Structures, Functions, Activity Limitations Requiring Skilled Therapeutic Intervention: Decreased ADL status; Decreased strength; Decreased endurance; Increased pain     Therapy Prognosis:   Therapy Prognosis: Good         PLAN   Effective Dates: 11/21/22 TO Plan of Care/Certification Expiration Date: 02/19/23     Frequency/Duration: Plan Frequency: 2x a week for 90 days     Interventions Planned (Treatment may consist of any combination of the following):    Current Treatment Recommendations: Strengthening; ROM; Functional mobility training; Stair training; Neuromuscular re-education; Manual Therapy - Joint Manipulation; Manual Therapy - Soft Tissue Mobilization; Therapeutic activities;  Home exercise program; Endurance training     Goals: (Goals have been discussed and agreed upon with patient.)  Discharge Goals: Time Frame: 6 weeks  Pt. Will report < 4/10 L knee pain with standing, walking, lifting, squatting to improve symptomatic complaints. MET  Pt. Will report 0/10 L knee pain with descending stairs to improve functional mobility. ONGOING (1/10 pain on 11/21/22)  Pt. Will complete 10 single LE squats without handheld assistance to improve lower quarter strength. ONGOING  Pt. Will be independent with HEP to prevent return of symptoms. ONGOING  Pt. Will score > 70 on the LEFS to demonstrate improved pain and function. ONGOING         Outcome Measure: Tool Used: Lower Extremity Functional Scale (LEFS)  Score: Initial: 62/80 Most Recent: 66/80 (Date: 11/21/22 )   Interpretation of Score: 20 questions each scored on a 5 point scale with 0 representing \"extreme difficulty or unable to perform\" and 4 representing \"no difficulty\". The lower the score, the greater the functional disability. 80/80 represents no disability. Minimal detectable change is 9 points. Medical Necessity:   > Patient is expected to demonstrate progress in strength, range of motion, and balance to increase independence with ADL's and work activities. .  Reason For Services/Other Comments:  Patient will benefit from skilled PT to address impairments identified through evaluation and return to previous ADL and recreational capacity. Total Duration:  Time In: 1030  Time Out: 36    Regarding Terry Berrios therapy, I certify that the treatment plan above will be carried out by a therapist or under their direction. Thank you for this referral,  Marina Schofield, PT     Referring Physician Signature:  Mariel Palmer MD _______________________________ Date _____________        Post Session Pain  Charge Capture  PT Visit Info MD Guidelines  MyChart

## 2022-11-21 NOTE — PROGRESS NOTES
Patel Jimenez  : 1965  Primary: Nic Zayas  Secondary:  Lakshmi Denton @ [facility] [address]  Phone: [phone]  Fax: [phone] Plan Frequency: 2x a week for 90 days  Plan of Care/Certification Expiration Date: 22      PT Visit Info: Total # of Visits to Date: 21      OUTPATIENT PHYSICAL THERAPY:OP NOTE TYPE: Treatment Note 2022     Appt Desk   Episode      Treatment Diagnosis:   Patellar Tendonitis, L knee [M76.52]; Chondromalacia patellae, L knee [M22.42]  Medical/Referring Diagnosis:  Patellar tendinitis, unspecified knee [M76.50]  Chondromalacia patellae, left knee [M22.42]  Referring Physician:  Elizabeth Dumont MD MD Orders:  PT Eval and Treat   Date of Onset:  Chronic  Allergies:  Patient has no allergy information on record. Restrictions/Precautions:   None  Interventions Planned (Treatment may consist of any combination of the following):    Current Treatment Recommendations: Strengthening; ROM; Functional mobility training; Stair training; Neuromuscular re-education; Manual Therapy - Joint Manipulation; Manual Therapy - Soft Tissue Mobilization; Therapeutic activities; Home exercise program; Endurance training     Subjective Comments: Pt. Reports mild L knee pain. Pt. Notes sometimes he feels like the L knee may give way while walking. Initial:     3/10 Post Session:     3/10  Medications Last Reviewed:  2022  Updated Objective Findings:  See evaluation note from today  Treatment   THERAPEUTIC EXERCISE: (40 minutes):    Exercises per grid below to improve mobility and strength. Required minimal visual, verbal and manual cues to promote proper body alignment, promote proper body posture and promote proper body mechanics. Progressed resistance, range and repetitions as indicated.      Date:  2022   Activity/Exercise Parameters   Shuttle unilateral LE press (5 band) --   Golfers lift --   Bridges with LE extension 3 x 10   Single LE squat handheld assist 3 x 10   Squats Eccentrics (50#) 4 x 10   Step downs (2\") 3 x 10   Resisted forward backward walking (cable 15#) --   IT band stretch 4 minutes   Lateral bounding --   Lateral walking (green band) --   lunges 3 x 5   sidelying hip abduction 3 x 10   Leg press (unilateral) 60# 3 x 10   Stairs 3 minutes   TKE purple band --   Deadlift (25#) 3 x 10   Warrior pose 4 minutes       MANUAL THERAPY: (15 minutes):   Joint mobilization and Soft tissue mobilization was utilized and necessary because of the patient's restricted joint motion, loss of articular motion and restricted motion of soft tissue. Soft tissue mobilization: L quadriceps  Instrument assisted soft tissue mobilization: L patellar tendon, calf  Joint mobilization: patellofemoral glides, all glides grade III-IV          Treatment/Session Summary:    Treatment Assessment:  Pt. Continues to demonstrate pain with L single LE squats. L quadriceps strength and endurance remains limited compared to the R side. Pt. Demonstrates improved performance with stair negotiation including descending stair today. Communication/Consultation:  None today  Equipment provided today:  None  Recommendations/Intent for next treatment session: Next visit will focus on L LE strengthening.     Total Treatment Billable Duration:  55 minutes  Time In: 1030  Time Out: [address] Session Pain  Charge Capture  [employer] Portal  MD Guidelines  Scanned Media  Benefits  MyChart

## 2022-12-19 ENCOUNTER — HOSPITAL ENCOUNTER (OUTPATIENT)
Dept: PHYSICAL THERAPY | Age: 57
Setting detail: RECURRING SERIES
Discharge: HOME | End: 2022-12-22
Payer: COMMERCIAL

## 2022-12-19 PROCEDURE — 97110 THERAPEUTIC EXERCISES: CPT

## 2022-12-19 PROCEDURE — 97140 MANUAL THERAPY 1/> REGIONS: CPT

## 2022-12-19 ASSESSMENT — PAIN SCALES - GENERAL: PAINLEVEL_OUTOF10: 4

## 2022-12-19 NOTE — PROGRESS NOTES
Sg Howe  : 1965  Primary: Prerna Gaxiola Sc  Secondary:  Claudia Lindsay @ [facility] [address]  Phone: [phone]  Fax: [phone] Plan Frequency: 2x a week for 90 days  Plan of Care/Certification Expiration Date: 23      PT Visit Info: Total # of Visits to Date: 25      OUTPATIENT PHYSICAL THERAPY:OP NOTE TYPE: Treatment Note 2022     Appt Desk   Episode      Treatment Diagnosis:   Patellar Tendonitis, L knee [M76.52]; Chondromalacia patellae, L knee [M22.42]  Medical/Referring Diagnosis:  Patellar tendinitis, unspecified knee [M76.50]  Chondromalacia patellae, left knee [M22.42]  Referring Physician:  Shanthi Black MD MD Orders:  PT Eval and Treat   Date of Onset:  Chronic  Allergies:  Patient has no allergy information on record. Restrictions/Precautions:   None  Interventions Planned (Treatment may consist of any combination of the following):    Current Treatment Recommendations: Strengthening; ROM; Functional mobility training; Stair training; Neuromuscular re-education; Manual Therapy - Joint Manipulation; Manual Therapy - Soft Tissue Mobilization; Therapeutic activities; Home exercise program; Endurance training     Subjective Comments: Pt. 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 Pt. Notes when he is not working the pain is not that bad. Initial:     4/10 Post Session:     3/10  Medications Last Reviewed:  2022  Updated Objective Findings:  See evaluation note from today  Treatment   THERAPEUTIC EXERCISE: (30 minutes):    Exercises per grid below to improve mobility and strength. Required minimal visual, verbal and manual cues to promote proper body alignment, promote proper body posture and promote proper body mechanics. Progressed resistance, range and repetitions as indicated.      Date:  2022   Activity/Exercise Parameters   Shuttle unilateral LE press (5 band) 3 x 10   Golfers lift 3 x 10   Bridges with LE extension 3 x 10   Single LE squat handheld assist --   Squats Eccentrics (50#) 4 x 10   Step downs (2\") 3 x 10   Resisted forward backward walking (cable 15#) --   IT band stretch 4 minutes   Lateral bounding --   Lateral walking (green band) --   lunges --   sidelying hip abduction 3 x 10   Leg press (unilateral) 60# --   Stairs --   TKE purple band --   Deadlift (25#) --   Warrior pose --       MANUAL THERAPY: (25 minutes):   Joint mobilization and Soft tissue mobilization was utilized and necessary because of the patient's restricted joint motion, loss of articular motion and restricted motion of soft tissue. Soft tissue mobilization: L quadriceps, IT band, bluteals  Instrument assisted soft tissue mobilization: L patellar tendon, calf  Joint mobilization: patellofemoral glides, all glides grade III-IV  Joint mobilization: lumbar rotation grade IV          Treatment/Session Summary:    Treatment Assessment:  Exercise intensity is reduced today secondary to increased L knee pain. Pt. Is tender to palpation of the patellar tendon and with superior patellar glides today. Progression of strength program has been slow secondary to limited attendance to PT. Communication/Consultation:  None today  Equipment provided today:  None  Recommendations/Intent for next treatment session: Next visit will focus on L LE strengthening.     Total Treatment Billable Duration:  55 minutes  Time In: 0930  Time Out: [address] [address] Session Pain  Charge Capture  [employer] Portal  MD Guidelines  Scanned Media  Benefits  MyChart

## 2023-01-09 ENCOUNTER — APPOINTMENT (OUTPATIENT)
Dept: PHYSICAL THERAPY | Age: 58
End: 2023-01-09
Payer: COMMERCIAL

## 2023-01-23 ENCOUNTER — HOSPITAL ENCOUNTER (OUTPATIENT)
Dept: PHYSICAL THERAPY | Age: 58
Setting detail: RECURRING SERIES
Discharge: HOME | End: 2023-01-26
Payer: COMMERCIAL

## 2023-01-23 PROCEDURE — 97140 MANUAL THERAPY 1/> REGIONS: CPT

## 2023-01-23 PROCEDURE — 97110 THERAPEUTIC EXERCISES: CPT

## 2023-01-23 ASSESSMENT — PAIN SCALES - GENERAL: PAINLEVEL_OUTOF10: 4

## 2023-01-23 NOTE — PROGRESS NOTES
Brandyn Solomon  : 1965  Primary: [address] [facility]  Secondary:  [address] @ [place]  [facility] [address]  Phone: [phone]  Fax: [phone] Plan Frequency: 2x a week for 90 days  Plan of Care/Certification Expiration Date: 23      PT Visit Info: Total # of Visits to Date: 22      OUTPATIENT PHYSICAL THERAPY:OP NOTE TYPE: Treatment Note 2023     Appt Desk   Episode      Treatment Diagnosis:   Patellar Tendonitis, L knee [M76.52]; Chondromalacia patellae, L knee [M22.42]  Medical/Referring Diagnosis:  Patellar tendinitis, unspecified knee [M76.50]  Chondromalacia patellae, left knee [M22.42]  Referring Physician:  Luna Molina MD MD Orders:  PT Eval and Treat   Date of Onset:  Chronic  Allergies:  Patient has no allergy information on record. Restrictions/Precautions:   None  Interventions Planned (Treatment may consist of any combination of the following):    Current Treatment Recommendations: Strengthening; ROM; Functional mobility training; Stair training; Neuromuscular re-education; Manual Therapy - Joint Manipulation; Manual Therapy - Soft Tissue Mobilization; Therapeutic activities; Home exercise program; Endurance training     Subjective Comments: Pt. Reports one severe incident of L knee pain. Pt. Notes he twisted his knee while getting out of the shower and experienced 20 minutes of moderate knee pain. The pain gradually improved and was sore the next day. Symptoms are back to baseline now. Initial:     4/10 Post Session:     3/10  Medications Last Reviewed:  2023  Updated Objective Findings: Thessaly test (+) L side. Treatment   THERAPEUTIC EXERCISE: (30 minutes):    Exercises per grid below to improve mobility and strength. Required minimal visual, verbal and manual cues to promote proper body alignment, promote proper body posture and promote proper body mechanics.   Progressed resistance, range and repetitions as indicated. Date:  6/13/2022   Activity/Exercise Parameters   Shuttle unilateral LE press (5 band) 3 x 10   Golfers lift --   Bridges with LE extension 3 x 10   Single LE squat handheld assist --   Squats Eccentrics (50#) 4 x 10   Step downs (2\") 3 x 10   Resisted forward backward walking (cable 15#) --   IT band stretch 4 minutes   Lateral bounding --   Lateral walking (green band) 4 minutes   lunges --   sidelying hip abduction 3 x 10   Leg press (unilateral) 60# --   Stairs --   TKE purple band --   Deadlift (25#) --   Warrior pose --       MANUAL THERAPY: (25 minutes):   Joint mobilization and Soft tissue mobilization was utilized and necessary because of the patient's restricted joint motion, loss of articular motion and restricted motion of soft tissue. Soft tissue mobilization: L quadriceps, IT band, gluteals  Instrument assisted soft tissue mobilization: L patellar tendon, calf  Joint mobilization: patellofemoral glides, all glides grade III-IV  Joint mobilization: lumbar rotation grade IV          Treatment/Session Summary:    Treatment Assessment:  Assessment of L knee today reveals pain during Thessaly testing of the L knee. Pt. May have irritation of the meniscus in the L knee. Pt. Demonstrates improved patellofemoral symptoms in the anterior knee. Communication/Consultation:  None today  Equipment provided today:  None  Recommendations/Intent for next treatment session: Next visit will focus on L LE strengthening.     Total Treatment Billable Duration:  55 minutes  Time In: 0930  Time Out: [address] Session Pain  Charge Capture  [facility] Portal  MD Guidelines  Scanned Media  Benefits  MyChart

## 2023-02-06 ENCOUNTER — HOSPITAL ENCOUNTER (OUTPATIENT)
Dept: PHYSICAL THERAPY | Age: 58
Setting detail: RECURRING SERIES
Discharge: HOME | End: 2023-02-09
Payer: COMMERCIAL

## 2023-02-06 PROCEDURE — 97140 MANUAL THERAPY 1/> REGIONS: CPT

## 2023-02-06 PROCEDURE — 97110 THERAPEUTIC EXERCISES: CPT

## 2023-02-06 ASSESSMENT — PAIN SCALES - GENERAL: PAINLEVEL_OUTOF10: 4

## 2023-02-20 ENCOUNTER — HOSPITAL ENCOUNTER (OUTPATIENT)
Dept: PHYSICAL THERAPY | Age: 58
Setting detail: RECURRING SERIES
Discharge: HOME | End: 2023-02-23
Payer: COMMERCIAL

## 2023-02-20 PROCEDURE — 97110 THERAPEUTIC EXERCISES: CPT

## 2023-02-20 PROCEDURE — 97140 MANUAL THERAPY 1/> REGIONS: CPT

## 2023-02-20 ASSESSMENT — PAIN SCALES - GENERAL: PAINLEVEL_OUTOF10: 4

## 2023-02-20 NOTE — PROGRESS NOTES
Mallika Moreno  : 1965  Primary: [address] [facility]  Secondary:  [address] @ [place]  [facility] [address]  Phone: [phone]  Fax: [phone] Plan Frequency: 2x a week for 90 days  Plan of Care/Certification Expiration Date: 23      PT Visit Info: Total # of Visits to Date: 32      OUTPATIENT PHYSICAL THERAPY:OP NOTE TYPE: Treatment Note 2023     Appt Desk   Episode      Treatment Diagnosis:   Patellar Tendonitis, L knee [M76.52]; Chondromalacia patellae, L knee [M22.42]  Medical/Referring Diagnosis:  Patellar tendinitis, unspecified knee [M76.50]  Chondromalacia patellae, left knee [M22.42]  Referring Physician:  Robert Collado MD MD Orders:  PT Eval and Treat   Date of Onset:  Chronic  Allergies:  Patient has no allergy information on record. Restrictions/Precautions:   None  Interventions Planned (Treatment may consist of any combination of the following):    Current Treatment Recommendations: Strengthening; ROM; Functional mobility training; Stair training; Neuromuscular re-education; Manual Therapy - Joint Manipulation; Manual Therapy - Soft Tissue Mobilization; Therapeutic activities; Home exercise program; Endurance training     Subjective Comments: Pt. Reports he will have the gel injection in the L knee soon. Pt. Reports pain has been improved this week but he has limited his lifting and pushing at work. Initial:     4/10 Post Session:     4/10  Medications Last Reviewed:  2023  Updated Objective Findings:  See evaluation note from today  Treatment   THERAPEUTIC EXERCISE: (30 minutes):    Exercises per grid below to improve mobility and strength. Required minimal visual, verbal and manual cues to promote proper body alignment, promote proper body posture and promote proper body mechanics. Progressed resistance, range and repetitions as indicated.      Date:  2022   Activity/Exercise Parameters   Shuttle unilateral LE press (5 band) 3 x 10   Golfers lift --   Bridges with LE extension 3 x 10   Single LE squat handheld assist --   Squats Eccentrics (50#) --   Step downs (2\") 3 x 10   Resisted forward backward walking (cable 15#) --   IT band stretch 4 minutes   Lateral bounding --   Lateral walking (green band) 4 minutes   lunges --   sidelying hip abduction 3 x 10   Leg press (unilateral) 60# --   Stairs --   TKE purple band 3 x 10   Deadlift (25#) --   Warrior pose --       MANUAL THERAPY: (25 minutes):   Joint mobilization and Soft tissue mobilization was utilized and necessary because of the patient's restricted joint motion, loss of articular motion and restricted motion of soft tissue. Soft tissue mobilization: L quadriceps, IT band, gluteals, calf  Instrument assisted soft tissue mobilization: L patellar tendon, calf (NOT PERFORMED)  Joint mobilization: patellofemoral glides, all glides grade III-IV  Joint mobilization: lumbar rotation grade IV  Joint mobilization: long axis traction grade IV          Treatment/Session Summary:    Treatment Assessment:  Pain complaints in the patellar region are improved this week and patient demonstrates improved tolerance to lower quarter strengthening. Rotation through the L knee remains painful. Communication/Consultation:  None today  Equipment provided today:  None  Recommendations/Intent for next treatment session: Next visit will focus on L LE strengthening.     Total Treatment Billable Duration:  55 minutes  Time In: 0930  Time Out: [address] Session Pain  Charge Capture  [employer] Portal  MD Guidelines  Scanned Media  Benefits  MyChart

## 2023-02-20 NOTE — THERAPY RECERTIFICATION
Kushal Rodriguez  : 1965  Primary: [address] [facility]  Secondary:  [address] @ [place] [address]  [facility] [address]  Phone: [phone]  Fax: [phone] Plan Frequency: 2x a week for 90 days  Plan of Care/Certification Expiration Date: 23      PT Visit Info: Total # of Visits to Date: 32        OUTPATIENT PHYSICAL THERAPY:OP NOTE TYPE: Recertification 4902               Episode  Appt Desk         Treatment Diagnosis:  Patellar Tendonitis, L knee [M76.52]; Chondromalacia patellae, L knee [M22.42]  Medical/Referring Diagnosis:  Patellar tendinitis, unspecified knee [M76.50]  Chondromalacia patellae, left knee [M22.42]  Referring Physician:  Shoaib Loyola MD MD Orders:  PT Eval and Treat   Return MD Appt:  TBD  Date of Onset:  Chronic  Allergies:  Patient has no allergy information on record. Restrictions/Precautions:    Restrictions/Precautions: None  Medications Last Reviewed:  2023     SUBJECTIVE   Pt. Notes knee pain remains improved with occasional pain descending stairs in the L knee and with certain heavy duty activities at work. Pt. Also notes the knee seems unsteady at times with walking. Patient Stated Goal(s): \"Improve knee pain\"  Initial:      /10 Post Session:      /10  Past Medical History/Comorbidities:   Mr. Scot Fothergill  has a past medical history of Hypertriglyceridemia, IBS (irritable bowel syndrome), Vitamin B12 deficiency, and Vitamin D deficiency. Mr. Scot Fothergill  has no past surgical history on file.           OBJECTIVE   UPDATED OBJECTIVE FINDINGS:      .                                       Right                                         Left  Quadriceps: -- --   Hamstrings -- --   Triceps Surae -- --   Patellar Tendon -- tender   Pes Anserine -- --   Iliotibial Band -- --   Other:          Range of Motion:   degrees                              Right                                        Left  Knee Extension 6 5   Knee Flexion 138 134   Ankle Dorsiflexion 10 10      Strength:                                                Right                                        Left  Hip Extension 5/5 5/5   Hip Abduction 4+/5 4+/5   Knee Extension 5/5 5/5   Knee Flexion 5/5 5/5   Functional Squat Limited but no longer painful        Flexibility:  Iliopsoas: Hamstring: Quadriceps: limited   TFL: limited Calf:       ASSESSMENT   INITIAL ASSESSMENT:  Mr. Edy Hassan presents with signs and symptoms of referring diagnosis. Pt. Sustained a patellar fracture over a year ago and has not been able to fully get over knee pain since injury. Pt. Experienced pain in the L patellar tendon with squatting activities and during palpation of the tendon. Mobility of the L anterior interval and patellofemoral joint is restricted compared to R side. Meniscus and ligamentous testing is negative. Pt. Demonstrates remaining quadriceps atrophy in the L thigh compared to R side. IT band tightness is also present in the L LE compared to R. Pt. Will benefit from manual therapy to address remaining pain and specific strengthening exercises to improve L quadriceps strength. Pt. Will benefit from skilled PT at this time. 5/9/22 Progress Report: Pt. Attends 10 physical therapy sessions. Pt. Demonstrates improved subjective L knee pain with daily activities and now reports a 3/10 pain with ADL's. The pain does increase with descending stairs, athletic activities, and pushing/pivoting on the L LE.  L quadriceps strength continues to improve with prescribed exercises but remains limited compared to R side. Pt. Is no longer tender to palpation of the patellar tendon. Pt. Will benefit from continued strengthening in the L hip and quadriceps to address remaining pain and limitations. 8/15/22 Progress Report: Pt. Attends 16 physical therapy sessions. Subjective complaints of L knee pain continue to improve since initial evaluation.   Pt. Continues to experience pain in the patellar region with descending stairs and twisting forcefully through the L knee. Strength and neuromuscular control in the L quadriceps musculature continues to improve with prescribed exercises. Atrophy is the L quadriceps is less noticeable compared to initial evaluation and patient requies less cueing for appropriate form during exercises. Pt. Will benefit from continued quadriceps strengthening to address remaining knee pain and prevent return of symptoms. 11/22/22 Progress Report: Pt. Attends 23 physical therapy sessions. Pt. Is know able to descend stairs in a normal pattern with only mild L anterior knee pain. Subjective pain complaints continue to improve with ADL and work activities. L quadriceps strength and neuromuscular control remains limited compared to R side. Pt. Is unable to complete single LE squat on the L side secondary to anterior knee pain. Mild tightness is also present in the L iliotibial band contributing to patellofemoral pain. Pt. Continues to work on HEP and will benefit from gradual advancement of quadriceps strengthening program as tolerated to address remaining goals. 2/20/23 Progress Report: Pt. Only attends 4 physical therapy sessions since last progress note on 11/22/22. Conflicts in schedule mostly limit attendance. Pt. Has experienced a worsening of symptoms over the past 3 months during limited attendance. Symptoms appear patellofemoral in nature along with potential meniscus pathology. Thessaly testing is (+) on the L side along with patellar glides/tilting. L quadriceps strength has improved with prescribed exercise but strength remains limited compared to the R side. Advancement of strengthening is slow currently secondary to increased knee pain. Pt. Is scheduled to receive injections to address continued pain. Pt. Will benefit from resumption of quadriceps and hip strengthening progressions as pain improves.         Problem List: (Impacting functional limitations): Body Structures, Functions, Activity Limitations Requiring Skilled Therapeutic Intervention: Decreased ADL status; Decreased strength; Decreased endurance; Increased pain     Therapy Prognosis:   Therapy Prognosis: Good         PLAN   Effective Dates: 02/20/23 TO Plan of Care/Certification Expiration Date: 05/21/23     Frequency/Duration: Plan Frequency: 2x a week for 90 days     Interventions Planned (Treatment may consist of any combination of the following):    Current Treatment Recommendations: Strengthening; ROM; Functional mobility training; Stair training; Neuromuscular re-education; Manual Therapy - Joint Manipulation; Manual Therapy - Soft Tissue Mobilization; Therapeutic activities; Home exercise program; Endurance training     Goals: (Goals have been discussed and agreed upon with patient.)  Discharge Goals: Time Frame: 6 weeks  Pt. Will report < 4/10 L knee pain with standing, walking, lifting, squatting to improve symptomatic complaints. MET  Pt. Will report 0/10 L knee pain with descending stairs to improve functional mobility. ONGOING (1/10 pain on 11/21/22)  Pt. Will complete 10 single LE squats without handheld assistance to improve lower quarter strength. ONGOING  Pt. Will be independent with HEP to prevent return of symptoms. ONGOING  Pt. Will score > 70 on the LEFS to demonstrate improved pain and function. ONGOING         Outcome Measure: Tool Used: Lower Extremity Functional Scale (LEFS)  Score: Initial: 62/80 Most Recent: 58/80 (Date: 2/20/23 )   Interpretation of Score: 20 questions each scored on a 5 point scale with 0 representing \"extreme difficulty or unable to perform\" and 4 representing \"no difficulty\". The lower the score, the greater the functional disability. 80/80 represents no disability. Minimal detectable change is 9 points.       Medical Necessity:   > Patient is expected to demonstrate progress in strength, range of motion, and balance to increase independence with ADL's and work activities. .  Reason For Services/Other Comments:  Patient will benefit from skilled PT to address impairments identified through evaluation and return to previous ADL and recreational capacity. Total Duration:  Time In: 0930  Time Out: 56    Regarding Dudleyie Bumpers therapy, I certify that the treatment plan above will be carried out by a therapist or under their direction. Thank you for this referral,  Keya Schroeder, PT     Referring Physician Signature:  Caty Engel MD _______________________________ Date _____________        Post Session Pain  Charge Capture  PT Visit Info MD Guidelines  MyChart

## 2023-03-06 ENCOUNTER — HOSPITAL ENCOUNTER (OUTPATIENT)
Dept: PHYSICAL THERAPY | Age: 58
Setting detail: RECURRING SERIES
Discharge: HOME | End: 2023-03-09
Payer: COMMERCIAL

## 2023-03-06 PROCEDURE — 97140 MANUAL THERAPY 1/> REGIONS: CPT

## 2023-03-06 PROCEDURE — 97110 THERAPEUTIC EXERCISES: CPT

## 2023-03-06 ASSESSMENT — PAIN SCALES - GENERAL: PAINLEVEL_OUTOF10: 4

## 2023-03-06 NOTE — PROGRESS NOTES
Akiko Nagy  : 1965  Primary: [address] [facility]  Secondary:  [address] @ [place] [address]  [facility] [address]  Phone: [phone]  Fax: [phone] Plan Frequency: 2x a week for 90 days  Plan of Care/Certification Expiration Date: 23      PT Visit Info: Total # of Visits to Date: 32      OUTPATIENT PHYSICAL THERAPY:OP NOTE TYPE: Treatment Note 3/6/2023     Appt Desk   Episode      Treatment Diagnosis:   Patellar Tendonitis, L knee [M76.52]; Chondromalacia patellae, L knee [M22.42]  Medical/Referring Diagnosis:  Patellar tendinitis, unspecified knee [M76.50]  Chondromalacia patellae, left knee [M22.42]  Referring Physician:  Monique Burk MD MD Orders:  PT Eval and Treat   Date of Onset:  Chronic  Allergies:  Patient has no allergy information on record. Restrictions/Precautions:   None  Interventions Planned (Treatment may consist of any combination of the following):    Current Treatment Recommendations: Strengthening; ROM; Functional mobility training; Stair training; Neuromuscular re-education; Manual Therapy - Joint Manipulation; Manual Therapy - Soft Tissue Mobilization; Therapeutic activities; Home exercise program; Endurance training     Subjective Comments: Pt. Notes he had a steroid injection in the L knee last week. Injection provided some pain relief but he feels the knee is stiff now. Initial:     4/10 Post Session:     4/10  Medications Last Reviewed:  3/6/2023  Updated Objective Findings:   L knee flexion 131 degrees, L knee extension 5degrees, R knee flexion 140 degrees, R knee extension 5 degrees  Treatment   THERAPEUTIC EXERCISE: (30 minutes):    Exercises per grid below to improve mobility and strength. Required minimal visual, verbal and manual cues to promote proper body alignment, promote proper body posture and promote proper body mechanics. Progressed resistance, range and repetitions as indicated. Date:  6/13/2022   Activity/Exercise Parameters   Shuttle unilateral LE press (5 band) --   Golfers lift --   Bridges with LE extension 3 x 10   Single LE squat handheld assist 3 x 10   Squats Eccentrics (25#) 3 x 10   Step downs (2\") --   Resisted forward backward walking (cable 15#) --   IT band stretch 4 minutes   Lateral bounding --   Lateral walking (green band) --   lunges --   sidelying hip abduction 3 x 10   Leg press (unilateral) 60# --   Knee extensions (10#) 5 minutes   TKE purple band 3 x 10   Deadlift (25#) --   Warrior pose --       MANUAL THERAPY: (25 minutes):   Joint mobilization and Soft tissue mobilization was utilized and necessary because of the patient's restricted joint motion, loss of articular motion and restricted motion of soft tissue. Soft tissue mobilization: L quadriceps, IT band, gluteals, calf  Instrument assisted soft tissue mobilization: L patellar tendon, calf (NOT PERFORMED)  Joint mobilization: patellofemoral glides, all glides grade III-IV  Joint mobilization: lumbar rotation grade IV  Joint mobilization: long axis traction grade IV          Treatment/Session Summary:    Treatment Assessment:  Pt. Demonstrates reduced L knee flexion ROM compared to unaffected side this week. Some mild swelling is noted in the L knee. Pt. Tolerates return to quadriceps strengthening exercises without complaints today. Communication/Consultation:  None today  Equipment provided today:  None  Recommendations/Intent for next treatment session: Next visit will focus on L LE strengthening.     Total Treatment Billable Duration:  55 minutes  Time In: 0730  Time Out: 0830    Deb Judge, PT       Post Session Pain  Charge Capture  [employer] Portal  MD Guidelines  Scanned Media  Benefits  MyChart

## 2023-03-20 ENCOUNTER — HOSPITAL ENCOUNTER (OUTPATIENT)
Dept: PHYSICAL THERAPY | Age: 58
Setting detail: RECURRING SERIES
Discharge: HOME | End: 2023-03-23
Payer: COMMERCIAL

## 2023-03-20 PROCEDURE — 97140 MANUAL THERAPY 1/> REGIONS: CPT

## 2023-03-20 PROCEDURE — 97110 THERAPEUTIC EXERCISES: CPT

## 2023-03-20 ASSESSMENT — PAIN SCALES - GENERAL: PAINLEVEL_OUTOF10: 3

## 2023-03-20 NOTE — PROGRESS NOTES
15   Golfers lift --   Bridges with LE extension 3 x 10   Single LE squat handheld assist --   Squats Eccentrics (25#) 3 x 10   Step downs (2\") --   Resisted forward backward walking (cable 15#) --   IT band stretch 4 minutes   Lateral bounding --   Lateral walking (green band) --   lunges 3 x 10   sidelying hip abduction 3 x 10   Leg press (unilateral) 60# --   Knee extensions (10#) 5 minutes   TKE purple band --   Deadlift (25#) --   Warrior pose 4 minutes       MANUAL THERAPY: (25 minutes):   Joint mobilization and Soft tissue mobilization was utilized and necessary because of the patient's restricted joint motion, loss of articular motion and restricted motion of soft tissue. Soft tissue mobilization: L quadriceps, IT band, gluteals, calf  Instrument assisted soft tissue mobilization: L patellar tendon, calf (NOT PERFORMED)  Joint mobilization: patellofemoral glides, all glides grade III-IV  Joint mobilization: lumbar rotation grade IV  Joint mobilization: long axis traction grade IV          Treatment/Session Summary:    Treatment Assessment:  Patellar glides are pain free today and patient tolerates squats and lunges without complaints. PT will progress quadriceps strengthening as pain remains low. Communication/Consultation:  None today  Equipment provided today:  None  Recommendations/Intent for next treatment session: Next visit will focus on L LE strengthening.     Total Treatment Billable Duration:  55 minutes  Time In: 0930  Time Out: [address] Session Pain  Charge Capture  [employer] Portal  MD Guidelines  Scanned Media  Benefits  MyChart

## 2023-04-03 ENCOUNTER — HOSPITAL ENCOUNTER (OUTPATIENT)
Dept: PHYSICAL THERAPY | Age: 58
Setting detail: RECURRING SERIES
Discharge: HOME | End: 2023-04-06
Payer: COMMERCIAL

## 2023-04-03 PROCEDURE — 97140 MANUAL THERAPY 1/> REGIONS: CPT

## 2023-04-03 PROCEDURE — 97110 THERAPEUTIC EXERCISES: CPT

## 2023-04-03 ASSESSMENT — PAIN SCALES - GENERAL: PAINLEVEL_OUTOF10: 4

## 2023-04-03 NOTE — PROGRESS NOTES
Karol Reason  : 1965  Primary: [address] [facility]  Secondary:  [address] @ [place] [address]  [facility] [address]  Phone: [phone]  Fax: [phone] Plan Frequency: 2x a week for 90 days  Plan of Care/Certification Expiration Date: 23      PT Visit Info: Total # of Visits to Date: 34      OUTPATIENT PHYSICAL THERAPY:OP NOTE TYPE: Treatment Note 4/3/2023     Appt Desk   Episode      Treatment Diagnosis:   Patellar Tendonitis, L knee [M76.52]; Chondromalacia patellae, L knee [M22.42]  Medical/Referring Diagnosis:  Patellar tendinitis, unspecified knee [M76.50]  Chondromalacia patellae, left knee [M22.42]  Referring Physician:  Panchito Sandoval MD MD Orders:  PT Eval and Treat   Date of Onset:  Chronic  Allergies:  Patient has no allergy information on record. Restrictions/Precautions:   None  Interventions Planned (Treatment may consist of any combination of the following):    Current Treatment Recommendations: Strengthening; ROM; Functional mobility training; Stair training; Neuromuscular re-education; Manual Therapy - Joint Manipulation; Manual Therapy - Soft Tissue Mobilization; Therapeutic activities; Home exercise program; Endurance training     Subjective Comments: Pt. Notes episode of knee pain at work last week in which his L knee swelled up. Initial:     4/10 Post Session:     4/10  Medications Last Reviewed:  4/3/2023  Updated Objective Findings:   Mild swelling in the L knee  Treatment   THERAPEUTIC EXERCISE: (30 minutes):    Exercises per grid below to improve mobility and strength. Required minimal visual, verbal and manual cues to promote proper body alignment, promote proper body posture and promote proper body mechanics. Progressed resistance, range and repetitions as indicated.      Date:  2022   Activity/Exercise Parameters   Shuttle unilateral LE press (5 band) 3 x 15   Golfers lift 3 x 10   Bridges with LE extension 3

## 2023-04-24 ENCOUNTER — HOSPITAL ENCOUNTER (OUTPATIENT)
Dept: PHYSICAL THERAPY | Age: 58
Setting detail: RECURRING SERIES
Discharge: HOME | End: 2023-04-27
Payer: COMMERCIAL

## 2023-04-24 PROCEDURE — 97140 MANUAL THERAPY 1/> REGIONS: CPT

## 2023-04-24 PROCEDURE — 97110 THERAPEUTIC EXERCISES: CPT

## 2023-04-24 ASSESSMENT — PAIN SCALES - GENERAL: PAINLEVEL_OUTOF10: 3

## 2023-04-24 NOTE — PROGRESS NOTES
Mallika Moreno  : 1965  Primary: [address] [facility]  Secondary:  [address] @ [place]  [address]  Phone: [phone]  Fax: [phone] Plan Frequency: 2x a week for 90 days  Plan of Care/Certification Expiration Date: 23      PT Visit Info: Total # of Visits to Date: 32      OUTPATIENT PHYSICAL THERAPY:OP NOTE TYPE: Treatment Note 2023     Appt Desk   Episode      Treatment Diagnosis:   Patellar Tendonitis, L knee [M76.52]; Chondromalacia patellae, L knee [M22.42]  Medical/Referring Diagnosis:  Patellar tendinitis, unspecified knee [M76.50]  Chondromalacia patellae, left knee [M22.42]  Referring Physician:  Robert Collado MD MD Orders:  PT Eval and Treat   Date of Onset:  Chronic  Allergies:  Patient has no allergy information on record. Restrictions/Precautions:   None  Interventions Planned (Treatment may consist of any combination of the following):    Current Treatment Recommendations: Strengthening; ROM; Functional mobility training; Stair training; Neuromuscular re-education; Manual Therapy - Joint Manipulation; Manual Therapy - Soft Tissue Mobilization; Therapeutic activities; Home exercise program; Endurance training     Subjective Comments: Pt. Reports L knee pain has been improved. Pt. Notes he was on vacation a couple weeks ago that seemed to help. Initial:     3/10 Post Session:     3/10  Medications Last Reviewed:  2023  Updated Objective Findings:  None Today  Treatment   THERAPEUTIC EXERCISE: (40 minutes):    Exercises per grid below to improve mobility and strength. Required minimal visual, verbal and manual cues to promote proper body alignment, promote proper body posture and promote proper body mechanics. Progressed resistance, range and repetitions as indicated.      Date:  2022   Activity/Exercise Parameters   Shuttle unilateral LE press (5 band) 3 x 15   Golfers lift (8#) 3 x 10

## 2023-05-08 ENCOUNTER — HOSPITAL ENCOUNTER (OUTPATIENT)
Dept: PHYSICAL THERAPY | Age: 58
Setting detail: RECURRING SERIES
Discharge: HOME | End: 2023-05-11
Payer: COMMERCIAL

## 2023-05-08 PROCEDURE — 97110 THERAPEUTIC EXERCISES: CPT

## 2023-05-08 PROCEDURE — 97140 MANUAL THERAPY 1/> REGIONS: CPT

## 2023-05-08 ASSESSMENT — PAIN SCALES - GENERAL: PAINLEVEL_OUTOF10: 4

## 2023-05-08 NOTE — PROGRESS NOTES
Date:  6/13/2022   Activity/Exercise Parameters   Shuttle unilateral LE press (5 band) 3 x 15   Golfers lift (8#) 3 x 10   Bridges with LE extension --   Single LE squat handheld assist --   Squats Eccentrics (25#) 3 x 10   Step downs (2\") --   Resisted forward backward walking (cable 15#) --   IT band stretch 4 minutes   Quadriceps Stretch 10 minutes   Lateral bounding --   Lateral walking (green band) 4 minutes   lunges --   sidelying hip abduction 3 x 10   Leg press (unilateral) 60# --   Knee extensions (10#) --   TKE purple band --   Deadlift (25#) --   Warrior pose --       MANUAL THERAPY: (15 minutes):   Joint mobilization and Soft tissue mobilization was utilized and necessary because of the patient's restricted joint motion, loss of articular motion and restricted motion of soft tissue. Soft tissue mobilization: L quadriceps, IT band, gluteals, calf  Instrument assisted soft tissue mobilization: L patellar tendon, calf   Joint mobilization: patellofemoral glides, all glides grade III-IV  Joint mobilization: lumbar rotation grade IV  Joint mobilization: long axis traction grade IV (NOT PERFORMED)          Treatment/Session Summary:     Treatment Assessment:  Increased stretching to the quadriceps musculature is performed today to address worsening complaints of R knee stiffness. Pt. May benefit from increased flexibility and manual therapy to address patellofemoral joint.                        Communication/Consultation:  None today  Equipment provided today:  None  Recommendations/Intent for next treatment session: Next visit will focus on L LE strengthening and flexibility exercises    Total Treatment Billable Duration:  55 minutes  Time In: 0730  Time Out: 0830    Valri Band, PT       Post Session Pain  Charge Capture  MedBridge Portal  MD Guidelines  Scanned Media  Benefits  MyChart

## 2023-05-22 ENCOUNTER — HOSPITAL ENCOUNTER (OUTPATIENT)
Dept: PHYSICAL THERAPY | Age: 58
Setting detail: RECURRING SERIES
Discharge: HOME | End: 2023-05-25
Payer: COMMERCIAL

## 2023-05-22 PROCEDURE — 97140 MANUAL THERAPY 1/> REGIONS: CPT

## 2023-05-22 PROCEDURE — 97110 THERAPEUTIC EXERCISES: CPT

## 2023-05-22 ASSESSMENT — PAIN SCALES - GENERAL: PAINLEVEL_OUTOF10: 4

## 2023-05-22 NOTE — PROGRESS NOTES
Sara Natarajan  : 1965  Primary: [address] [facility]  Secondary:  [address] @ [place]  [facility] [address]  Phone: [phone]  Fax: [phone] Plan Frequency: 2x a week for 90 days  Plan of Care/Certification Expiration Date: 23      PT Visit Info: Total # of Visits to Date: 35      OUTPATIENT PHYSICAL THERAPY:OP NOTE TYPE: Treatment Note 2023     Appt Desk   Episode      Treatment Diagnosis:   Patellar Tendonitis, L knee [M76.52]; Chondromalacia patellae, L knee [M22.42]  Medical/Referring Diagnosis:  Patellar tendinitis, unspecified knee [M76.50]  Chondromalacia patellae, left knee [M22.42]  Referring Physician:  Mamadou Carl MD MD Orders:  PT Eval and Treat   Date of Onset:  Chronic  Allergies:  Patient has no allergy information on record. Restrictions/Precautions:   None  Interventions Planned (Treatment may consist of any combination of the following):    Current Treatment Recommendations: Strengthening; ROM; Functional mobility training; Stair training; Neuromuscular re-education; Manual Therapy - Joint Manipulation; Manual Therapy - Soft Tissue Mobilization; Therapeutic activities; Home exercise program; Endurance training     Subjective Comments: Pt. Notes the past few weeks have been good except for a few days at work. Pt. Notes heavy lifting at work usually aggravates pain. Initial:     4/10 Post Session:     4/10  Medications Last Reviewed:  2023  Updated Objective Findings:  See evaluation note from today  Treatment   THERAPEUTIC EXERCISE: (40 minutes):    Exercises per grid below to improve mobility and strength. Required minimal visual, verbal and manual cues to promote proper body alignment, promote proper body posture and promote proper body mechanics. Progressed resistance, range and repetitions as indicated.      Date:  2022   Activity/Exercise Parameters   Shuttle unilateral LE press (5 band) 3

## 2023-05-22 NOTE — THERAPY RECERTIFICATION
David Peters  : 1965  Primary: [address] [facility]  Secondary:  [address] @ [place]  [facility] [address]  Phone: [phone]  Fax: [phone] Plan Frequency: 2x a week for 90 days  Plan of Care/Certification Expiration Date: 23      PT Visit Info: Total # of Visits to Date: 35        OUTPATIENT PHYSICAL THERAPY:OP NOTE TYPE: Recertification                Episode  Appt Desk         Treatment Diagnosis:  Patellar Tendonitis, L knee [M76.52]; Chondromalacia patellae, L knee [M22.42]  Medical/Referring Diagnosis:  Patellar tendinitis, unspecified knee [M76.50]  Chondromalacia patellae, left knee [M22.42]  Referring Physician:  Jeannine Crook MD MD Orders:  PT Eval and Treat   Return MD Appt:  TBD  Date of Onset:  Chronic  Allergies:  Patient has no allergy information on record. Restrictions/Precautions:    Restrictions/Precautions: None  Medications Last Reviewed:  2023     SUBJECTIVE   Pt. Reports L knee pain remains present with heavy work activities and descending stairs. Pain is intermittent. Patient Stated Goal(s): \"Improve knee pain\"  Initial:     4/10 Post Session:     4/10  Past Medical History/Comorbidities:   Mr. Tha Rashid  has a past medical history of Hypertriglyceridemia, IBS (irritable bowel syndrome), Vitamin B12 deficiency, and Vitamin D deficiency. Mr. Tha Rashid  has no past surgical history on file.           OBJECTIVE   UPDATED OBJECTIVE FINDINGS:      .                                       Right                                         Left  Quadriceps: -- --   Hamstrings -- --   Triceps Surae -- --   Patellar Tendon -- tender   Pes Anserine -- --   Iliotibial Band -- --   Other:          Range of Motion:   degrees                              Right                                        Left  Knee Extension 6 4   Knee Flexion 138 135   Ankle Dorsiflexion 10 10      Strength:

## 2023-06-05 ENCOUNTER — HOSPITAL ENCOUNTER (OUTPATIENT)
Dept: PHYSICAL THERAPY | Age: 58
Setting detail: RECURRING SERIES
Discharge: HOME | End: 2023-06-08
Payer: COMMERCIAL

## 2023-06-05 PROCEDURE — 97140 MANUAL THERAPY 1/> REGIONS: CPT

## 2023-06-05 PROCEDURE — 97110 THERAPEUTIC EXERCISES: CPT

## 2023-06-05 ASSESSMENT — PAIN SCALES - GENERAL: PAINLEVEL_OUTOF10: 3

## 2023-06-05 NOTE — PROGRESS NOTES
lunges --   sidelying hip abduction 3 x 10   Leg press (unilateral) 60# --   Knee extensions (10#) 3 x 10   TKE purple band --   Deadlift (25#) 3 x 10   Warrior pose --       MANUAL THERAPY: (15 minutes):   Joint mobilization and Soft tissue mobilization was utilized and necessary because of the patient's restricted joint motion, loss of articular motion and restricted motion of soft tissue. Soft tissue mobilization: L quadriceps, IT band, gluteals, calf  Instrument assisted soft tissue mobilization: L patellar tendon, calf (NOT PERFORMED)  Joint mobilization: patellofemoral glides, all glides grade III-IV  Joint mobilization: lumbar rotation grade IV  Joint mobilization: long axis traction grade IV (NOT PERFORMED)          Treatment/Session Summary:     Treatment Assessment:  Pt. Experiences mild pain in the L knee during squatting exercise today as PT attempted to increased weight. Pt. Favors the L LE by putting more weight through the R LE during weight bearing exercise. Patellar mobilizations have been less painful.                          Communication/Consultation:  None today  Equipment provided today:  None  Recommendations/Intent for next treatment session: Next visit will focus on L LE strengthening and flexibility exercises    Total Treatment Billable Duration:  55 minutes  Time In: 0830  Time Out: 0930    Irvin Vo, PT       Post Session Pain  Charge Capture  Anjuke Portal  MD Guidelines  Scanned Media  Benefits  MyChart

## 2023-06-19 ENCOUNTER — HOSPITAL ENCOUNTER (OUTPATIENT)
Dept: PHYSICAL THERAPY | Age: 58
Setting detail: RECURRING SERIES
Discharge: HOME | End: 2023-06-22
Payer: COMMERCIAL

## 2023-06-19 PROCEDURE — 97110 THERAPEUTIC EXERCISES: CPT

## 2023-06-19 PROCEDURE — 97140 MANUAL THERAPY 1/> REGIONS: CPT

## 2023-06-19 ASSESSMENT — PAIN SCALES - GENERAL: PAINLEVEL_OUTOF10: 3

## 2023-06-19 NOTE — PROGRESS NOTES
Milton Harrison  : 1965  Primary: [address] [facility]  Secondary:  [address] @ [place]  [facility] [address]  Phone: [phone]  Fax: [phone] Plan Frequency: 2x a week for 90 days  Plan of Care/Certification Expiration Date: 23      PT Visit Info: Total # of Visits to Date: 28      OUTPATIENT PHYSICAL THERAPY:OP NOTE TYPE: Treatment Note 2023     Appt Desk   Episode      Treatment Diagnosis:   Patellar Tendonitis, L knee [M76.52]; Chondromalacia patellae, L knee [M22.42]  Medical/Referring Diagnosis:  Patellar tendinitis, unspecified knee [M76.50]  Chondromalacia patellae, left knee [M22.42]  Referring Physician:  Torrey Gong MD MD Orders:  PT Eval and Treat   Date of Onset:  Chronic  Allergies:  Patient has no allergy information on record. Restrictions/Precautions:   None  Interventions Planned (Treatment may consist of any combination of the following):    No data recorded     Subjective Comments: Pt. Notes mild increase in L knee pain following last PT session. Pt. Notes f/u with primary care physician whom encouraged patient to get a second opinion on the L knee. Pt. Notes physical therapy seems to help with the L knee pain. Initial:     3/10 Post Session:     3/10  Medications Last Reviewed:  2023  Updated Objective Findings:  None Today  Treatment   THERAPEUTIC EXERCISE: (40 minutes):    Exercises per grid below to improve mobility and strength. Required minimal visual, verbal and manual cues to promote proper body alignment, promote proper body posture and promote proper body mechanics. Progressed resistance, range and repetitions as indicated.      Date:  2022   Activity/Exercise Parameters   Shuttle unilateral LE press (5 band) 3 x 15   Golfers lift (8#) --   Valma Jerel with LE extension 3 x 10   Single LE squat handheld assist --   Squats Eccentrics (25#) 3 x 10   Step downs (2\") 3 x 10   Resisted

## 2023-07-24 ENCOUNTER — HOSPITAL ENCOUNTER (OUTPATIENT)
Dept: PHYSICAL THERAPY | Age: 58
Setting detail: RECURRING SERIES
Discharge: HOME | End: 2023-07-27
Payer: COMMERCIAL

## 2023-07-24 PROCEDURE — 97110 THERAPEUTIC EXERCISES: CPT

## 2023-07-24 PROCEDURE — 97140 MANUAL THERAPY 1/> REGIONS: CPT

## 2023-07-24 ASSESSMENT — PAIN SCALES - GENERAL: PAINLEVEL_OUTOF10: 3

## 2023-07-24 NOTE — PROGRESS NOTES
Maria Fernanda Quinn  : 1965  Primary: [facility]  Secondary:  [address]  @ [address] ,  [address] [place] [address]  Phone: [phone]  Fax: [phone] Plan Frequency: 2x a week for 90 days  Plan of Care/Certification Expiration Date: 23      PT Visit Info: Total # of Visits to Date: 28      OUTPATIENT PHYSICAL THERAPY:OP NOTE TYPE: Treatment Note 2023     Appt Desk   Episode      Treatment Diagnosis:   Patellar Tendonitis, L knee [M76.52]; Chondromalacia patellae, L knee [M22.42]  Medical/Referring Diagnosis:  Patellar tendinitis, unspecified knee [M76.50]  Chondromalacia patellae, left knee [M22.42]  Referring Physician:  Barbie Oliveira MD MD Orders:  PT Eval and Treat   Date of Onset:  Chronic  Allergies:  Patient has no allergy information on record. Restrictions/Precautions:   None  Interventions Planned (Treatment may consist of any combination of the following):    No data recorded     Subjective Comments: Pt. Reports being out of town on vacation for 2 weeks. Pt. Notes when he did a lot of walking on vacation he felt like his knee would give way at times. Initial:     3/10 Post Session:     3/10  Medications Last Reviewed:  2023  Updated Objective Findings:   Lateral patellar tilt present on the L side  Treatment   THERAPEUTIC EXERCISE: (40 minutes):    Exercises per grid below to improve mobility and strength. Required minimal visual, verbal and manual cues to promote proper body alignment, promote proper body posture and promote proper body mechanics. Progressed resistance, range and repetitions as indicated.      Date:  2022   Activity/Exercise Parameters   Shuttle unilateral LE press (5 band) 3 x 15   Golfers lift (8#) 3 x 10   Bridges with LE extension 3 x 10   Single LE squat handheld assist --   Squats Eccentrics (25#) 3 x 10   Step downs (2\") 3 x 10   Resisted forward backward walking (cable 15#) --   IT

## 2023-08-07 ENCOUNTER — HOSPITAL ENCOUNTER (OUTPATIENT)
Dept: PHYSICAL THERAPY | Age: 58
Setting detail: RECURRING SERIES
End: 2023-08-07
Payer: COMMERCIAL

## 2023-08-07 NOTE — PROGRESS NOTES
Inder [place]  : 1965  Primary: [facility]  Secondary:  [address] @ [address] [facility]  [address]  [address],  [address] [place] [address]  Phone: [phone]  Fax: [phone]       2023      Patient cancelled today's appointment and will plan to follow up at next scheduled visit.        Grace Del Castillo, PT

## 2023-08-21 ENCOUNTER — HOSPITAL ENCOUNTER (OUTPATIENT)
Dept: PHYSICAL THERAPY | Age: 58
Setting detail: RECURRING SERIES
Discharge: HOME | End: 2023-08-24
Payer: COMMERCIAL

## 2023-08-21 PROCEDURE — 97110 THERAPEUTIC EXERCISES: CPT

## 2023-08-21 PROCEDURE — 97140 MANUAL THERAPY 1/> REGIONS: CPT

## 2023-08-21 ASSESSMENT — PAIN SCALES - GENERAL: PAINLEVEL_OUTOF10: 3

## 2023-08-21 NOTE — THERAPY RECERTIFICATION
knee pain. Symptoms remain persistent and are usually worsened with twisting on the L knee or during eccentric loads on the L knee. L quadriceps and hip strength remains improved since starting PT and patient continues to benefit from flexibility exercises in the lower quarter. Patellar tendon symptoms seem improved at this time. Pt. Will benefit from continued skilled PT to improve pain, ADL's and recreational activities     8/21/23 Progress Report: Objective measures in the L knee remain similar to last progress notes. Patellar tendon symptoms remain improved but patient continues to subjectively experience L knee swelling with prolonged standing and walking activities. Pt. Notes if he walks too fast the L knee will give way. Pt. Also notes twisting on the L knee is painful. Although progress toward set goals is slow, Physical therapy to improve flexibility and strength in the lower quarter remains beneficial for patient. Pt. Will benefit from continued intermittent PT to prevent worsening of symptoms and improve ADL performance. Problem List: (Impacting functional limitations): Body Structures, Functions, Activity Limitations Requiring Skilled Therapeutic Intervention: Decreased functional mobility ; Decreased ADL status; Decreased ROM; Decreased tolerance to work activity; Decreased strength; Decreased endurance; Increased pain       Therapy Prognosis:   Therapy Prognosis: Good           PLAN   Effective Dates: 08/21/23 TO Plan of Care/Certification Expiration Date: 11/19/23     Frequency/Duration: Plan Frequency: 1x a week for 90 days       Interventions Planned (Treatment may consist of any combination of the following):    No data recorded     Goals: (Goals have been discussed and agreed upon with patient.)  Discharge Goals: Time Frame: 6 weeks  Pt. Will report < 4/10 L knee pain with standing, walking, lifting, squatting to improve symptomatic complaints. MET  Pt.  Will report 0/10 L knee

## 2023-08-21 NOTE — PROGRESS NOTES
Ruthy Barnard  : 1965  Primary: [facility]  Secondary:  [address] @ [address] ,  [address] [place] [address]  Phone: [phone]  Fax: [phone] Plan Frequency: 1x a week for 90 days  Plan of Care/Certification Expiration Date: 23      PT Visit Info: Total # of Visits to Date: 28      OUTPATIENT PHYSICAL THERAPY:OP NOTE TYPE: Treatment Note 2023     Appt Desk   Episode      Treatment Diagnosis:   Patellar Tendonitis, L knee [M76.52]; Chondromalacia patellae, L knee [M22.42]  Medical/Referring Diagnosis:  Patellar tendinitis, unspecified knee [M76.50]  Chondromalacia patellae, left knee [M22.42]  Referring Physician:  Elkin Lieberman MD MD Orders:  PT Eval and Treat   Date of Onset:  Chronic  Allergies:  Patient has no allergy information on record. Restrictions/Precautions:   None  Interventions Planned (Treatment may consist of any combination of the following):    No data recorded     Subjective Comments: Pt. Notes swelling in the L knee with prolonged standing/walking. Pt. Notes physical therapy does help his knee pain. Initial:     3/10 Post Session:     3/10  Medications Last Reviewed:  2023  Updated Objective Findings:  See evaluation note from today  Treatment   THERAPEUTIC EXERCISE: (40 minutes):    Exercises per grid below to improve mobility and strength. Required minimal visual, verbal and manual cues to promote proper body alignment, promote proper body posture and promote proper body mechanics. Progressed resistance, range and repetitions as indicated.      Date:  2022   Activity/Exercise Parameters   Shuttle unilateral LE press (5 band) 3 x 15   Golfers lift (8#) --   Dre Cowden with LE extension 3 x 10   Single LE squat handheld assist --   Squats Eccentrics (25#) 3 x 10   Step downs (2\") 3 x 10   Resisted forward backward walking (cable 15#) --   IT band stretch 4 minutes   Quadriceps Stretch 6 minutes

## 2023-09-11 ENCOUNTER — HOSPITAL ENCOUNTER (OUTPATIENT)
Dept: PHYSICAL THERAPY | Age: 58
Setting detail: RECURRING SERIES
Discharge: HOME | End: 2023-09-14
Payer: COMMERCIAL

## 2023-09-11 PROCEDURE — 97110 THERAPEUTIC EXERCISES: CPT

## 2023-09-11 PROCEDURE — 97140 MANUAL THERAPY 1/> REGIONS: CPT

## 2023-09-11 ASSESSMENT — PAIN SCALES - GENERAL: PAINLEVEL_OUTOF10: 2

## 2023-09-11 NOTE — PROGRESS NOTES
Parminder Woods  : 1965  Primary: [facility]  Secondary:  [address]  @ [address] [place] [address]  Phone: [phone]  Fax: [phone] Plan Frequency: 1x a week for 90 days  Plan of Care/Certification Expiration Date: 23      PT Visit Info: Total # of Visits to Date: 28      OUTPATIENT PHYSICAL THERAPY:OP NOTE TYPE: Treatment Note 2023     Appt Desk   Episode      Treatment Diagnosis:   Patellar Tendonitis, L knee [M76.52]; Chondromalacia patellae, L knee [M22.42]  Medical/Referring Diagnosis:  Patellar tendinitis, unspecified knee [M76.50]  Chondromalacia patellae, left knee [M22.42]  Referring Physician:  Hawa Thomas MD MD Orders:  PT Eval and Treat   Date of Onset:  Chronic  Allergies:  Patient has no allergy information on record. Restrictions/Precautions:   None  Interventions Planned (Treatment may consist of any combination of the following):    No data recorded     Subjective Comments: Pt. Reports f/u with referring MD.  Pt. Notes he decided not to have an injection in the L knee. Pt. Will f/u with physician in a year. Initial:     2/10 Post Session:     2/10  Medications Last Reviewed:  2023  Updated Objective Findings:  None Today  Treatment   THERAPEUTIC EXERCISE: (40 minutes):    Exercises per grid below to improve mobility and strength. Required minimal visual, verbal and manual cues to promote proper body alignment, promote proper body posture and promote proper body mechanics. Progressed resistance, range and repetitions as indicated.      Date:  2022   Activity/Exercise Parameters   Shuttle unilateral LE press (5 band) 3 x 15   Golfers lift (8#) --   Jefe Main with LE extension --   Single LE squat handheld assist --   Squats  (25#) 3 x 10   Step downs (2\") 3 x 10   Resisted forward backward walking (cable 15#) --   IT band stretch 4 minutes   Quadriceps Stretch 6 minutes   Lateral

## 2023-10-02 ENCOUNTER — HOSPITAL ENCOUNTER (OUTPATIENT)
Dept: PHYSICAL THERAPY | Age: 58
Setting detail: RECURRING SERIES
Discharge: HOME | End: 2023-10-05
Payer: COMMERCIAL

## 2023-10-02 PROCEDURE — 97110 THERAPEUTIC EXERCISES: CPT

## 2023-10-02 PROCEDURE — 97140 MANUAL THERAPY 1/> REGIONS: CPT

## 2023-10-02 ASSESSMENT — PAIN SCALES - GENERAL: PAINLEVEL_OUTOF10: 2

## 2023-10-02 NOTE — PROGRESS NOTES
Monique Marshall  : 1965  Primary: [facility]  Secondary:  [address] @ [address] , [address] [place] [address]  Phone: [phone]  Fax: [phone] Plan Frequency: 1x a week for 90 days  Plan of Care/Certification Expiration Date: 23      PT Visit Info: Total # of Visits to Date: 28      OUTPATIENT PHYSICAL THERAPY:OP NOTE TYPE: Treatment Note 10/2/2023     Appt Desk   Episode      Treatment Diagnosis:   Patellar Tendonitis, L knee [M76.52]; Chondromalacia patellae, L knee [M22.42]  Medical/Referring Diagnosis:  Patellar tendinitis, unspecified knee [M76.50]  Chondromalacia patellae, left knee [M22.42]  Referring Physician:  Doreen Garvey MD MD Orders:  PT Eval and Treat   Date of Onset:  Chronic  Allergies:  Patient has no allergy information on record. Restrictions/Precautions:   None  Interventions Planned (Treatment may consist of any combination of the following):    No data recorded     Subjective Comments: Pt. Notes symptoms have been mostly low level over the past couple of weeks. .                    Initial:     2/10 Post Session:     2/10  Medications Last Reviewed:  10/2/2023  Updated Objective Findings:  None Today  Treatment   THERAPEUTIC EXERCISE: (40 minutes):    Exercises per grid below to improve mobility and strength. Required minimal visual, verbal and manual cues to promote proper body alignment, promote proper body posture and promote proper body mechanics. Progressed resistance, range and repetitions as indicated.      Date:  2022   Activity/Exercise Parameters   Shuttle unilateral LE press (5 band) 3 x 15   Golfers lift (8#) 3 x 10   Bridges with LE extension 3 x 10   Single LE squat handheld assist 3 x 10   Squats  (25#) 3 x 10   Step downs (2\") --   Resisted forward backward walking (cable 15#) --   IT band stretch 4 minutes   Quadriceps Stretch 6 minutes   Lateral bounding --   Lateral walking (green band) --   lunges

## 2023-10-16 ENCOUNTER — APPOINTMENT (OUTPATIENT)
Dept: PHYSICAL THERAPY | Age: 58
End: 2023-10-16
Payer: COMMERCIAL

## 2023-10-30 ENCOUNTER — APPOINTMENT (OUTPATIENT)
Dept: PHYSICAL THERAPY | Age: 58
End: 2023-10-30
Payer: COMMERCIAL

## 2023-11-13 ENCOUNTER — HOSPITAL ENCOUNTER (OUTPATIENT)
Dept: PHYSICAL THERAPY | Age: 58
Setting detail: RECURRING SERIES
Discharge: HOME | End: 2023-11-16
Payer: COMMERCIAL

## 2023-11-13 PROCEDURE — 97140 MANUAL THERAPY 1/> REGIONS: CPT

## 2023-11-13 PROCEDURE — 97110 THERAPEUTIC EXERCISES: CPT

## 2023-11-13 ASSESSMENT — PAIN SCALES - GENERAL: PAINLEVEL_OUTOF10: 2

## 2023-11-27 ENCOUNTER — APPOINTMENT (RX ONLY)
Dept: URBAN - METROPOLITAN AREA CLINIC 329 | Facility: CLINIC | Age: 58
Setting detail: DERMATOLOGY
End: 2023-11-27

## 2023-11-27 ENCOUNTER — HOSPITAL ENCOUNTER (OUTPATIENT)
Dept: PHYSICAL THERAPY | Age: 58
Setting detail: RECURRING SERIES
Discharge: HOME | End: 2023-11-30
Payer: COMMERCIAL

## 2023-11-27 DIAGNOSIS — L91.8 OTHER HYPERTROPHIC DISORDERS OF THE SKIN: ICD-10-CM

## 2023-11-27 DIAGNOSIS — L82.1 OTHER SEBORRHEIC KERATOSIS: ICD-10-CM

## 2023-11-27 PROCEDURE — ? BENIGN DESTRUCTION

## 2023-11-27 PROCEDURE — 17111 DESTRUCTION B9 LESIONS 15/>: CPT

## 2023-11-27 PROCEDURE — 97140 MANUAL THERAPY 1/> REGIONS: CPT

## 2023-11-27 PROCEDURE — ? COUNSELING

## 2023-11-27 PROCEDURE — 99202 OFFICE O/P NEW SF 15 MIN: CPT | Mod: 25

## 2023-11-27 PROCEDURE — 97110 THERAPEUTIC EXERCISES: CPT

## 2023-11-27 ASSESSMENT — LOCATION DETAILED DESCRIPTION DERM
LOCATION DETAILED: LEFT CLAVICULAR NECK
LOCATION DETAILED: RIGHT AXILLARY VAULT
LOCATION DETAILED: LEFT MEDIAL INFERIOR CHEST
LOCATION DETAILED: RIGHT INFERIOR LATERAL NECK
LOCATION DETAILED: RIGHT POSTERIOR AXILLA
LOCATION DETAILED: PERIUMBILICAL SKIN
LOCATION DETAILED: LEFT INFERIOR LATERAL NECK
LOCATION DETAILED: RIGHT CLAVICULAR SKIN
LOCATION DETAILED: RIGHT CLAVICULAR NECK
LOCATION DETAILED: LEFT AXILLARY VAULT

## 2023-11-27 ASSESSMENT — LOCATION ZONE DERM
LOCATION ZONE: TRUNK
LOCATION ZONE: NECK
LOCATION ZONE: AXILLAE

## 2023-11-27 ASSESSMENT — LOCATION SIMPLE DESCRIPTION DERM
LOCATION SIMPLE: CHEST
LOCATION SIMPLE: RIGHT AXILLARY VAULT
LOCATION SIMPLE: RIGHT POSTERIOR AXILLA
LOCATION SIMPLE: ABDOMEN
LOCATION SIMPLE: LEFT AXILLARY VAULT
LOCATION SIMPLE: RIGHT ANTERIOR NECK
LOCATION SIMPLE: LEFT ANTERIOR NECK
LOCATION SIMPLE: RIGHT CLAVICULAR SKIN

## 2023-11-27 ASSESSMENT — PAIN SCALES - GENERAL: PAINLEVEL_OUTOF10: 2

## 2023-11-27 NOTE — THERAPY RECERTIFICATION
Sandrita Common  : 1965  Primary: [facility]  Secondary:  [address] @ [address] , [address] [place] [address]  Phone: [phone]  Fax: [phone] Plan Frequency: 1x a week for 90 days    Plan of Care/Certification Expiration Date: 24      PT Visit Info: Total # of Visits to Date: 28        OUTPATIENT PHYSICAL THERAPY:OP NOTE TYPE: Recertification                Episode  Appt Desk         Treatment Diagnosis:  Patellar Tendonitis, L knee [M76.52]; Chondromalacia patellae, L knee [M22.42]  Medical/Referring Diagnosis:  Patellar tendinitis, unspecified knee [M76.50]  Chondromalacia patellae, left knee [M22.42]  Referring Physician:  Reynold Dancer, MD MD Orders:  PT Eval and Treat   Return MD Appt:  TBD  Date of Onset:  Chronic  Allergies:  Patient has no allergy information on record. Restrictions/Precautions:    No data recorded  Medications Last Reviewed:  2023     SUBJECTIVE   Pt. Notes swelling in the L knee with prolonged walking/standing activity. Pain is present when he lifts too much weight, gets down on his knee, or with twisting movements. Patient Stated Goal(s): \"Improve knee pain\"  Initial:     2/10 Post Session:     2/10  Past Medical History/Comorbidities:   Mr. Rajwinder Nunez  has a past medical history of Hypertriglyceridemia, IBS (irritable bowel syndrome), Vitamin B12 deficiency, and Vitamin D deficiency. Mr. Rajwinder Nunez  has no past surgical history on file.           OBJECTIVE   UPDATED OBJECTIVE FINDINGS:      .                                       Right                                         Left  Quadriceps: -- --   Hamstrings -- --   Triceps Surae -- --   Patellar Tendon -- tender   Pes Anserine -- --   Iliotibial Band -- --   Plantar intrinsics tender  --       Range of Motion:   degrees                              Right                                        Left  Knee Extension 6 3   Knee Flexion 138 133

## 2023-11-27 NOTE — PROCEDURE: BENIGN DESTRUCTION
Detail Level: Detailed
Post-Care Instructions: I reviewed with the patient in detail post-care instructions. Patient is to wear sunprotection, and avoid picking at any of the treated lesions. Pt may apply Vaseline to crusted or scabbing areas.
Bill Insurance (You Assume Risk Of Denial Or Audit By Selecting Yes): Yes
Include Z78.9 (Other Specified Conditions Influencing Health Status) As An Associated Diagnosis?: No
Anesthesia Volume In Cc: 0.5
Medical Necessity Clause: This procedure was medically necessary because the lesions that were treated were:
Consent: The patient's consent was obtained including but not limited to risks of crusting, scabbing, blistering, scarring, darker or lighter pigmentary change, recurrence, incomplete removal and infection.
Medical Necessity Information: It is in your best interest to select a reason for this procedure from the list below. All of these items fulfill various CMS LCD requirements except the new and changing color options.

## 2023-11-27 NOTE — PROGRESS NOTES
Parminder Press  : 1965  Primary: Marquez  Secondary:  Frank Alcantara @ [facility]  [address]  [address] [facility]  Phone: [phone]  Fax: [phone] Plan Frequency: 1x a week for 90 days  Plan of Care/Certification Expiration Date: 24      PT Visit Info:        OUTPATIENT PHYSICAL TKARXUP:AK NOTE TYPE: Treatment Note 2023     Appt Desk   Episode      Treatment Diagnosis:   Patellar Tendonitis, L knee [M76.52]; Chondromalacia patellae, L knee [M22.42]  Medical/Referring Diagnosis:  Patellar tendinitis, unspecified knee [M76.50]  Chondromalacia patellae, left knee [M22.42]  Referring Physician:  Hawa Thomas MD MD Orders:  PT Eval and Treat   Date of Onset:  Chronic  Allergies:  Patient has no allergy information on record. Restrictions/Precautions:   None  Interventions Planned (Treatment may consist of any combination of the following):    No data recorded     Subjective Comments: Pt. Notes R plantar fasciitis pain remains most limiting pain right now. Pt. Reports L knee pain has been better but he has been out of work for 10 days. Initial:     2/10 Post Session:     2/10  Medications Last Reviewed:  2023  Updated Objective Findings:  See Recertification Note from today  Treatment   THERAPEUTIC EXERCISE: (40 minutes):    Exercises per grid below to improve mobility and strength. Required minimal visual, verbal and manual cues to promote proper body alignment, promote proper body posture and promote proper body mechanics. Progressed resistance, range and repetitions as indicated.      Date:  2022   Activity/Exercise Parameters   Shuttle unilateral LE press (5 band) 3 x 15   Golfers lift (8#) 3 x 10   Bridges with LE extension 3 x 10   Single LE squat handheld assist --   Squats  (25#) --   Step downs (2\") 3 x 10   Resisted forward backward walking (cable 15#) 3 x 10   IT band stretch 4 minutes   Quadriceps Stretch 6

## 2024-01-08 ENCOUNTER — HOSPITAL ENCOUNTER (OUTPATIENT)
Dept: PHYSICAL THERAPY | Age: 59
Setting detail: RECURRING SERIES
Discharge: HOME | End: 2024-01-11
Payer: COMMERCIAL

## 2024-01-08 PROCEDURE — 97110 THERAPEUTIC EXERCISES: CPT

## 2024-01-08 PROCEDURE — 97140 MANUAL THERAPY 1/> REGIONS: CPT

## 2024-01-08 ASSESSMENT — PAIN SCALES - GENERAL: PAINLEVEL_OUTOF10: 2

## 2024-01-08 NOTE — PROGRESS NOTES
Víctor Ashraf  : 1965  Primary: Saul Damian Federal  Secondary:  [facility] @ [place]  [address] [place] [address]  Phone: [phone]  Fax: [phone] Plan Frequency: 1x a week for 90 days  Plan of Care/Certification Expiration Date: 24      PT Visit Info:        OUTPATIENT PHYSICAL THERAPY:OP NOTE TYPE: Treatment Note 2024     Appt Desk   Episode      Treatment Diagnosis:   Patellar Tendonitis, L knee [M76.52]; Chondromalacia patellae, L knee [M22.42]  Medical/Referring Diagnosis:  Patellar tendinitis, unspecified knee [M76.50]  Chondromalacia patellae, left knee [M22.42]  Referring Physician:  Stephen Fierro MD MD Orders:  PT Eval and Treat   Date of Onset:  Chronic  Allergies:  Patient has no allergy information on record.  Restrictions/Precautions:   None  Interventions Planned (Treatment may consist of any combination of the following):    No data recorded     Subjective Comments: Pt. Reports his job is changing at work.  Pt. Notes new job will be much harder on his body and he is afraid it will increase his knee pain. Pt. Is to talk to supervisor today to see if a different job can be requested                     Initial:     2/10 Post Session:     2/10  Medications Last Reviewed:  2024  Updated Objective Findings:  None Today  Treatment   THERAPEUTIC EXERCISE: (40 minutes):    Exercises per grid below to improve mobility and strength.  Required minimal visual, verbal and manual cues to promote proper body alignment, promote proper body posture and promote proper body mechanics.  Progressed resistance, range and repetitions as indicated.     Date:  2022   Activity/Exercise Parameters   Shuttle unilateral LE press (5 band) 3 x 15   Golfers lift (8#) --   Bridges with LE extension 3 x 10   Single LE squat handheld assist --   Squats  (25#) 2 x 10   Step downs (2\") --   Resisted forward backward walking (cable 15#) --   IT band stretch 4

## 2024-01-22 ENCOUNTER — HOSPITAL ENCOUNTER (OUTPATIENT)
Dept: PHYSICAL THERAPY | Age: 59
Setting detail: RECURRING SERIES
Discharge: HOME | End: 2024-01-25
Payer: COMMERCIAL

## 2024-01-22 PROCEDURE — 97110 THERAPEUTIC EXERCISES: CPT

## 2024-01-22 PROCEDURE — 97140 MANUAL THERAPY 1/> REGIONS: CPT

## 2024-01-22 ASSESSMENT — PAIN SCALES - GENERAL: PAINLEVEL_OUTOF10: 2

## 2024-01-22 NOTE — PROGRESS NOTES
Víctor Ashraf  : 1965  Primary: Saul Damian Federal  Secondary:  [facility] @ [place]  [address] [place] [address]  Phone: [phone]  Fax: [phone] Plan Frequency: 1x a week for 90 days  Plan of Care/Certification Expiration Date: 24      PT Visit Info:        OUTPATIENT PHYSICAL THERAPY:OP NOTE TYPE: Treatment Note 2024     Appt Desk   Episode      Treatment Diagnosis:   Patellar Tendonitis, L knee [M76.52]; Chondromalacia patellae, L knee [M22.42]  Medical/Referring Diagnosis:  Patellar tendinitis, unspecified knee [M76.50]  Chondromalacia patellae, left knee [M22.42]  Referring Physician:  Stephen Firero MD MD Orders:  PT Eval and Treat   Date of Onset:  Chronic  Allergies:  Patient has no allergy information on record.  Restrictions/Precautions:   None  Interventions Planned (Treatment may consist of any combination of the following):    No data recorded     Subjective Comments: Pt. Notes his new job is more physical than last job.  Pt. Denies new knee pain this week however.                      Initial:     2/10 Post Session:     2/10  Medications Last Reviewed:  2024  Updated Objective Findings:  None Today  Treatment   THERAPEUTIC EXERCISE: (40 minutes):    Exercises per grid below to improve mobility and strength.  Required minimal visual, verbal and manual cues to promote proper body alignment, promote proper body posture and promote proper body mechanics.  Progressed resistance, range and repetitions as indicated.     Date:  2022   Activity/Exercise Parameters   Shuttle unilateral LE press (5 band) 3 x 15   Golfers lift (8#) 3 x 10   Bridges with LE extension 3 x 10   Single LE squat handheld assist 3 x 5   Squats  (25#) --   Step downs (2\") --   Resisted forward backward walking (cable 15#) --   IT band stretch 4 minutes   Quadriceps Stretch 6 minutes   Lateral bounding --   Lateral walking (green band) 4 minutes   lunges --

## 2024-02-05 ENCOUNTER — HOSPITAL ENCOUNTER (OUTPATIENT)
Dept: PHYSICAL THERAPY | Age: 59
Setting detail: RECURRING SERIES
Discharge: HOME | End: 2024-02-08
Payer: COMMERCIAL

## 2024-02-05 PROCEDURE — 97140 MANUAL THERAPY 1/> REGIONS: CPT

## 2024-02-05 PROCEDURE — 97110 THERAPEUTIC EXERCISES: CPT

## 2024-02-05 ASSESSMENT — PAIN SCALES - GENERAL: PAINLEVEL_OUTOF10: 2

## 2024-02-05 NOTE — PROGRESS NOTES
Víctor Ashraf  : 1965  Primary: Saul Damian Federal  Secondary:  [facility] @ [place]  [address] [place] [address]  Phone: [phone]  Fax: [phone] Plan Frequency: 1x a week for 90 days  Plan of Care/Certification Expiration Date: 24      PT Visit Info:        OUTPATIENT PHYSICAL THERAPY:OP NOTE TYPE: Treatment Note 2024     Appt Desk   Episode      Treatment Diagnosis:   Patellar Tendonitis, L knee [M76.52]; Chondromalacia patellae, L knee [M22.42]  Medical/Referring Diagnosis:  Patellar tendinitis, unspecified knee [M76.50]  Chondromalacia patellae, left knee [M22.42]  Referring Physician:  Stephen Fierro MD MD Orders:  PT Eval and Treat   Date of Onset:  Chronic  Allergies:  Patient has no allergy information on record.  Restrictions/Precautions:   None  Interventions Planned (Treatment may consist of any combination of the following):    No data recorded     Subjective Comments: Pt. Reports good tolerance to new job role right now.  Pt. Is hoping to change jobs soon though.                       Initial:     2/10 Post Session:     2/10  Medications Last Reviewed:  2024  Updated Objective Findings:   2/10 L knee pain with superior patellar glides  Treatment   THERAPEUTIC EXERCISE: (40 minutes):    Exercises per grid below to improve mobility and strength.  Required minimal visual, verbal and manual cues to promote proper body alignment, promote proper body posture and promote proper body mechanics.  Progressed resistance, range and repetitions as indicated.     Date:  2022   Activity/Exercise Parameters   Shuttle unilateral LE press (5 band) 3 x 15   Golfers lift (8#) 3 x 10   Bridges with LE extension 3 x 10   Single LE squat handheld assist --   Squats  (25#) 3 x 10   Step downs (2\") --   Resisted forward backward walking (cable 15#) --   IT band stretch 4 minutes   Quadriceps Stretch 6 minutes   Lateral bounding --   Lateral walking

## 2024-02-19 ENCOUNTER — APPOINTMENT (OUTPATIENT)
Dept: PHYSICAL THERAPY | Age: 59
End: 2024-02-19
Payer: COMMERCIAL

## 2024-03-04 ENCOUNTER — HOSPITAL ENCOUNTER (OUTPATIENT)
Dept: PHYSICAL THERAPY | Age: 59
Setting detail: RECURRING SERIES
Discharge: HOME | End: 2024-03-07
Payer: COMMERCIAL

## 2024-03-04 PROCEDURE — 97140 MANUAL THERAPY 1/> REGIONS: CPT

## 2024-03-04 PROCEDURE — 97110 THERAPEUTIC EXERCISES: CPT

## 2024-03-04 ASSESSMENT — PAIN SCALES - GENERAL: PAINLEVEL_OUTOF10: 2

## 2024-03-04 NOTE — THERAPY RECERTIFICATION
injuries. Pt. Will benefit from continuation of current program.        Problem List: (Impacting functional limitations):    Body Structures, Functions, Activity Limitations Requiring Skilled Therapeutic Intervention: Decreased functional mobility ; Decreased ADL status; Decreased ROM; Decreased tolerance to work activity; Decreased strength; Decreased endurance; Increased pain       Therapy Prognosis:   Therapy Prognosis: Good           PLAN   Effective Dates: 3/4/24 TO Plan of Care/Certification Expiration Date: 06/02/24     Frequency/Duration: Plan Frequency: 1x a week for 90 days       Interventions Planned (Treatment may consist of any combination of the following):    No data recorded     Goals: (Goals have been discussed and agreed upon with patient.)  Discharge Goals: Time Frame: 6 weeks  Pt. Will report < 4/10 L knee pain with standing, walking, lifting, squatting to improve symptomatic complaints. MET  Pt. Will report 0/10 L knee pain with descending stairs to improve functional mobility. ONGOING   Pt. Will complete 10 single LE squats without handheld assistance to improve lower quarter strength. ONGOING  Pt. Will be independent with HEP to prevent return of symptoms. ONGOING  Pt. Will score > 70 on the LEFS to demonstrate improved pain and function. ONGOING         Outcome Measure: Tool Used: Lower Extremity Functional Scale (LEFS)  Score: Initial: 62/80 Most Recent: 66/80 (Date: 11/27/23 )   Interpretation of Score: 20 questions each scored on a 5 point scale with 0 representing \"extreme difficulty or unable to perform\" and 4 representing \"no difficulty\".  The lower the score, the greater the functional disability. 80/80 represents no disability.  Minimal detectable change is 9 points.      Medical Necessity:   > Patient is expected to demonstrate progress in strength, range of motion, and balance to increase independence with ADL's and work activities..  Reason For Services/Other Comments:  Patient

## 2024-03-04 NOTE — PROGRESS NOTES
Víctor Ashraf  : 1965  Primary: Saul Damian Federal  Secondary:  [facility] @ [place]  [address] [address]  Phone: [phone]  Fax: [phone] Plan Frequency: 1x a week for 90 days  Plan of Care/Certification Expiration Date: 24      PT Visit Info:        OUTPATIENT PHYSICAL THERAPY:OP NOTE TYPE: Treatment Note 3/4/2024     Appt Desk   Episode      Treatment Diagnosis:   Patellar Tendonitis, L knee [M76.52]; Chondromalacia patellae, L knee [M22.42]  Medical/Referring Diagnosis:  Patellar tendinitis, unspecified knee [M76.50]  Chondromalacia patellae, left knee [M22.42]  Referring Physician:  Stephen Fierro MD MD Orders:  PT Eval and Treat   Date of Onset:  Chronic  Allergies:  Patient has no allergy information on record.  Restrictions/Precautions:   None  Interventions Planned (Treatment may consist of any combination of the following):    No data recorded     Subjective Comments: Pt. Notes increased ache in back, arms and neck from new work position.  Pt. Reports knee remains about the same with some clicking occasionally. .                       Initial:     2/10 Post Session:     2/10  Medications Last Reviewed:  3/4/2024  Updated Objective Findings:  See Recertification Note from today  Treatment   THERAPEUTIC EXERCISE: (40 minutes):    Exercises per grid below to improve mobility and strength.  Required minimal visual, verbal and manual cues to promote proper body alignment, promote proper body posture and promote proper body mechanics.  Progressed resistance, range and repetitions as indicated.     Date:  2022   Activity/Exercise Parameters   Shuttle unilateral LE press (5 band) 3 x 15   Golfers lift (8#) 3 x 10   Bridges with LE extension 3 x 10   Single LE squat handheld assist --   Squats  (25#) 3 x 10   Step downs (2\") --   Resisted forward backward walking (cable 15#) --   IT band stretch 4 minutes   Quadriceps Stretch 6 minutes

## 2024-03-18 ENCOUNTER — HOSPITAL ENCOUNTER (OUTPATIENT)
Dept: PHYSICAL THERAPY | Age: 59
Setting detail: RECURRING SERIES
Discharge: HOME | End: 2024-03-21
Payer: COMMERCIAL

## 2024-03-18 PROCEDURE — 97140 MANUAL THERAPY 1/> REGIONS: CPT

## 2024-03-18 PROCEDURE — 97110 THERAPEUTIC EXERCISES: CPT

## 2024-03-18 ASSESSMENT — PAIN SCALES - GENERAL: PAINLEVEL_OUTOF10: 2

## 2024-03-18 NOTE — PROGRESS NOTES
Víctor Ashraf  : 1965  Primary: Saul Damian Federal  Secondary:  [facility] @ [place]  [address] [place] [address]  Phone: [phone]  Fax: [phone] Plan Frequency: 1x a week for 90 days  Plan of Care/Certification Expiration Date: 24      PT Visit Info:        OUTPATIENT PHYSICAL THERAPY:OP NOTE TYPE: Treatment Note 3/18/2024     Appt Desk   Episode      Treatment Diagnosis:   Patellar Tendonitis, L knee [M76.52]; Chondromalacia patellae, L knee [M22.42]  Medical/Referring Diagnosis:  Patellar tendinitis, unspecified knee [M76.50]  Chondromalacia patellae, left knee [M22.42]  Referring Physician:  Stephen Fierro MD MD Orders:  PT Eval and Treat   Date of Onset:  Chronic  Allergies:  Patient has no allergy information on record.  Restrictions/Precautions:   None  Interventions Planned (Treatment may consist of any combination of the following):    No data recorded     Subjective Comments: Pt. Reports R hip is bothering him this week from work activities.  Pt. Notes his L knee will swell up at times when working.                       Initial:     2/10 Post Session:     2/10  Medications Last Reviewed:  3/18/2024  Updated Objective Findings:  None Today  Treatment   THERAPEUTIC EXERCISE: (40 minutes):    Exercises per grid below to improve mobility and strength.  Required minimal visual, verbal and manual cues to promote proper body alignment, promote proper body posture and promote proper body mechanics.  Progressed resistance, range and repetitions as indicated.     Date:  2022   Activity/Exercise Parameters   Shuttle unilateral LE press (6 band) 3 x 15   Golfers lift (8#) --   Bridges with LE extension 3 x 10   Single LE squat handheld assist --   Squats  (25#) 3 x 10   Step downs (2\") --   Resisted forward backward walking (cable 15#) --   IT band stretch 4 minutes   Piriformis stretch 3 minutes   Quadriceps Stretch 6 minutes   Lateral bounding --

## 2024-04-01 ENCOUNTER — HOSPITAL ENCOUNTER (OUTPATIENT)
Dept: PHYSICAL THERAPY | Age: 59
Setting detail: RECURRING SERIES
Discharge: HOME | End: 2024-04-04
Payer: COMMERCIAL

## 2024-04-01 PROCEDURE — 97140 MANUAL THERAPY 1/> REGIONS: CPT

## 2024-04-01 PROCEDURE — 97110 THERAPEUTIC EXERCISES: CPT

## 2024-04-01 ASSESSMENT — PAIN SCALES - GENERAL: PAINLEVEL_OUTOF10: 2

## 2024-04-01 NOTE — PROGRESS NOTES
press (unilateral) 60# --   Knee extensions (10#) --   TKE purple band --   Deadlift (25#) --   Warrior pose 2 minutes   plank 3 minutes       MANUAL THERAPY: (15 minutes):   Joint mobilization and Soft tissue mobilization was utilized and necessary because of the patient's restricted joint motion, loss of articular motion and restricted motion of soft tissue.     Soft tissue mobilization: L quadriceps, IT band, gluteals, calf  Joint mobilization: patellofemoral glides, all glides grade III-IV  Joint mobilization: lumbar rotation grade IV  Joint mobilization: long axis traction grade IV  (NOT PERFORMED)        Treatment/Session Summary:     Treatment Assessment:  Pt. Continues to benefit from intermittent PT to maintain L chronic knee pain.  Pt. Tolerates flexibility exercises and lower quarter strengthening without complaints today.                             Communication/Consultation:  None today  Equipment provided today:  None  Recommendations/Intent for next treatment session: Next visit will focus on L LE strengthening and flexibility exercises    Total Treatment Billable Duration:  55 minutes  Time In: 1030  Time Out: 1130    Stephen Mckeon PT       Post Session Pain  Charge Capture  Med[facility] Portal  MD Guidelines  Scanned Media  Benefits  MyChart

## 2024-04-15 ENCOUNTER — HOSPITAL ENCOUNTER (OUTPATIENT)
Dept: PHYSICAL THERAPY | Age: 59
Setting detail: RECURRING SERIES
Discharge: HOME | End: 2024-04-18
Payer: COMMERCIAL

## 2024-04-15 PROCEDURE — 97110 THERAPEUTIC EXERCISES: CPT

## 2024-04-15 PROCEDURE — 97140 MANUAL THERAPY 1/> REGIONS: CPT

## 2024-04-15 ASSESSMENT — PAIN SCALES - GENERAL: PAINLEVEL_OUTOF10: 2

## 2024-04-15 NOTE — PROGRESS NOTES
Víctor Ashraf  : 1965  Primary: Saul Damian Federal  Secondary:  [facility] @ [place]  [address] [place] [address]  Phone: [phone]  Fax: [phone] Plan Frequency: 1x a week for 90 days  Plan of Care/Certification Expiration Date: 24      PT Visit Info:        OUTPATIENT PHYSICAL THERAPY:OP NOTE TYPE: Treatment Note 4/15/2024     Appt Desk   Episode      Treatment Diagnosis:   Patellar Tendonitis, L knee [M76.52]; Chondromalacia patellae, L knee [M22.42]  Medical/Referring Diagnosis:  Patellar tendinitis, unspecified knee [M76.50]  Chondromalacia patellae, left knee [M22.42]  Referring Physician:  Stephen Fierro MD MD Orders:  PT Eval and Treat   Date of Onset:  Chronic  Allergies:  Patient has no allergy information on record.  Restrictions/Precautions:   None  Interventions Planned (Treatment may consist of any combination of the following):    No data recorded     Subjective Comments: Pt. Notes L knee pain last night at work.  Pt. Reports work pace was fast last night.                        Initial:     2/10 Post Session:     2/10  Medications Last Reviewed:  4/15/2024  Updated Objective Findings:   L superior patellar mobilization reproduces pain.   Treatment   THERAPEUTIC EXERCISE: (40 minutes):    Exercises per grid below to improve mobility and strength.  Required minimal visual, verbal and manual cues to promote proper body alignment, promote proper body posture and promote proper body mechanics.  Progressed resistance, range and repetitions as indicated.     Date:  2022   Activity/Exercise Parameters   Shuttle unilateral LE press (6 band) 3 x 15   Golfers lift (8#) 3 x 10   Bridges with LE extension --   Single LE squat handheld assist --   Squats  (25#) 3 x 10   Step downs (2\") 3 x 10   Resisted forward backward walking (cable 15#) --   IT band stretch 4 minutes   Piriformis stretch 3 minutes   Quadriceps Stretch 6 minutes   Lateral bounding

## 2024-04-30 ENCOUNTER — APPOINTMENT (OUTPATIENT)
Dept: PHYSICAL THERAPY | Age: 59
End: 2024-04-30
Payer: COMMERCIAL

## 2024-05-14 ENCOUNTER — HOSPITAL ENCOUNTER (OUTPATIENT)
Dept: PHYSICAL THERAPY | Age: 59
Setting detail: RECURRING SERIES
Discharge: HOME | End: 2024-05-17
Payer: COMMERCIAL

## 2024-05-14 PROCEDURE — 97110 THERAPEUTIC EXERCISES: CPT

## 2024-05-14 PROCEDURE — 97140 MANUAL THERAPY 1/> REGIONS: CPT

## 2024-05-14 ASSESSMENT — PAIN SCALES - GENERAL: PAINLEVEL_OUTOF10: 2

## 2024-05-14 NOTE — PROGRESS NOTES
Víctor Ashraf  : 1965  Primary: Saul Damian Federal  Secondary:  [facility] @ [place]  [address] [place] [address]  Phone: [phone]  Fax: [phone] Plan Frequency: 1x a week for 90 days  Plan of Care/Certification Expiration Date: 24      PT Visit Info:        OUTPATIENT PHYSICAL THERAPY:OP NOTE TYPE: Treatment Note 2024     Appt Desk   Episode      Treatment Diagnosis:   Patellar Tendonitis, L knee [M76.52]; Chondromalacia patellae, L knee [M22.42]  Medical/Referring Diagnosis:  Patellar tendinitis, unspecified knee [M76.50]  Chondromalacia patellae, left knee [M22.42]  Referring Physician:  Stephen Fierro MD MD Orders:  PT Eval and Treat   Date of Onset:  Chronic  Allergies:  Patient has no allergy information on record.  Restrictions/Precautions:   None  Interventions Planned (Treatment may consist of any combination of the following):    No data recorded     Subjective Comments: Pt. Reports moderate GI symptoms over the past week secondary to taking antibiotics.  Pt. Does not feel up to a lot of exercise. .                        Initial:     2/10 Post Session:     2/10  Medications Last Reviewed:  2024  Updated Objective Findings:  None Today  Treatment   THERAPEUTIC EXERCISE: (30 minutes):    Exercises per grid below to improve mobility and strength.  Required minimal visual, verbal and manual cues to promote proper body alignment, promote proper body posture and promote proper body mechanics.  Progressed resistance, range and repetitions as indicated.     Date:  2022   Activity/Exercise Parameters   Shuttle unilateral LE press (6 band) --   Golfers lift (8#) 3 x 10   Bridges with LE extension 3 x 10   Single LE squat handheld assist --   Squats  (25#) 3 x 10   Step downs (2\") --   Resisted forward backward walking (cable 15#) --   IT band stretch 3 minutes   Piriformis stretch 3 minutes   Quadriceps Stretch 4 minutes   Lateral bounding

## 2024-06-18 ENCOUNTER — HOSPITAL ENCOUNTER (OUTPATIENT)
Dept: PHYSICAL THERAPY | Age: 59
Setting detail: RECURRING SERIES
Discharge: HOME | End: 2024-06-21
Payer: COMMERCIAL

## 2024-06-18 PROCEDURE — 97140 MANUAL THERAPY 1/> REGIONS: CPT

## 2024-06-18 PROCEDURE — 97110 THERAPEUTIC EXERCISES: CPT

## 2024-06-18 ASSESSMENT — PAIN SCALES - GENERAL: PAINLEVEL_OUTOF10: 2

## 2024-06-18 NOTE — PROGRESS NOTES
Víctor Ashraf  : 1965  Primary: Saul Damian Federal  Secondary:  [facility] @ [place]  [address] [place] [address]  Phone: [phone]  Fax: [phone] Plan Frequency: 1x a week for 90 days  Plan of Care/Certification Expiration Date: 24      PT Visit Info:        OUTPATIENT PHYSICAL THERAPY:OP NOTE TYPE: Treatment Note 2024     Appt Desk   Episode      Treatment Diagnosis:   Patellar Tendonitis, L knee [M76.52]; Chondromalacia patellae, L knee [M22.42]  Medical/Referring Diagnosis:  Patellar tendinitis, unspecified knee [M76.50]  Chondromalacia patellae, left knee [M22.42]  Referring Physician:  Stephen Fierro MD MD Orders:  PT Eval and Treat   Date of Onset:  Chronic  Allergies:  Patient has no allergy information on record.  Restrictions/Precautions:   None  Interventions Planned (Treatment may consist of any combination of the following):    No data recorded     Subjective Comments: Pt. Notes his L knee has been doing well since he has been on vacation the past couple of weeks.  Pt. Reports having a new referral from the doctor.                         Initial:     2/10 Post Session:     2/10  Medications Last Reviewed:  2024  Updated Objective Findings:  See Discharge Note from today  Treatment   THERAPEUTIC EXERCISE: (40 minutes):    Exercises per grid below to improve mobility and strength.  Required minimal visual, verbal and manual cues to promote proper body alignment, promote proper body posture and promote proper body mechanics.  Progressed resistance, range and repetitions as indicated.     Date:  2022   Activity/Exercise Parameters   Shuttle unilateral LE press (6 band) --   Golfers lift (8#) 3 x 10   Bridges with LE extension 3 x 10   Single LE squat handheld assist --   Squats  (25#) 3 x 10   Step downs (2\") --   Resisted forward backward walking (cable 15#) --   IT band stretch 3 minutes   Piriformis stretch 3 minutes

## 2024-06-18 NOTE — THERAPY DISCHARGE
Flexion 138 133   Ankle Dorsiflexion 10 10      Strength:                                                Right                                        Left  Hip Extension 5/5 5/5   Hip Abduction 4+/5 4+/5   Knee Extension 5/5 5/5   Knee Flexion 5/5 5/5   Functional Squat Limited but no longer painful        Flexibility:  Iliopsoas: Hamstring: Quadriceps: limited   TFL: limited Calf:       ASSESSMENT   INITIAL ASSESSMENT:  Mr. Ashraf presents with signs and symptoms of referring diagnosis. Pt. Sustained a patellar fracture over a year ago and has not been able to fully get over knee pain since injury.  Pt. Experienced pain in the L patellar tendon with squatting activities and during palpation of the tendon.  Mobility of the L anterior interval and patellofemoral joint is restricted compared to R side.  Meniscus and ligamentous testing is negative.  Pt. Demonstrates remaining quadriceps atrophy in the L thigh compared to R side.  IT band tightness is also present in the L LE compared to R.  Pt. Will benefit from manual therapy to address remaining pain and specific strengthening exercises to improve L quadriceps strength.  Pt. Will benefit from skilled PT at this time.     5/9/22 Progress Report: Pt. Attends 10 physical therapy sessions.  Pt. Demonstrates improved subjective L knee pain with daily activities and now reports a 3/10 pain with ADL's.  The pain does increase with descending stairs, athletic activities, and pushing/pivoting on the L LE.  L quadriceps strength continues to improve with prescribed exercises but remains limited compared to R side.  Pt. Is no longer tender to palpation of the patellar tendon.  Pt. Will benefit from continued strengthening in the L hip and quadriceps to address remaining pain and limitations.    8/15/22 Progress Report: Pt. Attends 16 physical therapy sessions.  Subjective complaints of L knee pain continue to improve since initial evaluation.  Pt. Continues to experience pain

## 2024-07-02 ENCOUNTER — APPOINTMENT (OUTPATIENT)
Dept: PHYSICAL THERAPY | Age: 59
End: 2024-07-02
Payer: COMMERCIAL

## 2024-07-16 ENCOUNTER — HOSPITAL ENCOUNTER (OUTPATIENT)
Dept: PHYSICAL THERAPY | Age: 59
Setting detail: RECURRING SERIES
Discharge: HOME | End: 2024-07-19
Payer: COMMERCIAL

## 2024-07-16 DIAGNOSIS — M22.42 CHONDROMALACIA PATELLAE, LEFT KNEE: ICD-10-CM

## 2024-07-16 DIAGNOSIS — M76.50 PATELLAR TENDINOSIS: ICD-10-CM

## 2024-07-16 DIAGNOSIS — M17.12 PATELLOFEMORAL ARTHRITIS OF LEFT KNEE: Primary | ICD-10-CM

## 2024-07-16 PROCEDURE — 97162 PT EVAL MOD COMPLEX 30 MIN: CPT

## 2024-07-16 PROCEDURE — 97110 THERAPEUTIC EXERCISES: CPT

## 2024-07-16 PROCEDURE — 97140 MANUAL THERAPY 1/> REGIONS: CPT

## 2024-07-16 ASSESSMENT — PAIN SCALES - GENERAL: PAINLEVEL_OUTOF10: 2

## 2024-07-16 NOTE — PROGRESS NOTES
Bridges with LE extension 3 x 10   Sidelying hip abduction 2 x 10                 Manual Therapy (10  Minutes): Manual techniques to facilitate improved motion and decrease pain. (Used abbreviations; PNF - proprioceptive neuromuscular facilitation; a/p - anterior to posterior; p/a - posterior to anterior; cpa - central posterior anterior; upa -unilateral posterior anterior; SAL- superior anterior lateral glide; HVLAT - High Velocity Low Amplitude Thrust)  Soft tissue mobilization: L quadriceps, gluteals, calf  Joint mobilization: patellofemoral joint glides all directions grade II-III      Treatment/Session Summary:    Treatment Assessment:   Pt. Tolerates manual therapy and strengthening exercises today without complaints  Communication/Consultation:  None today  Equipment provided today:  HEP  Recommendations/Intent for next treatment session: Next visit will focus on manual therapy, flexibility, and lower quarter strengthening.    >Total Treatment Billable Duration:  35 minutes evaluation, 10 minutes manual therapy, 15 minutes therapeutic exercise.    Time In: 1030  Time Out: 1130    Stephen Mckeon PT         Charge Capture  Events  [facility]  Appt Desk  Attendance Report     Future Appointments   Date Time Provider Department Center   7/30/2024 10:30 AM Stephen Mckeon PT [facility]   8/13/2024 10:30 AM Stephen Mckeon PT [facility]   8/27/2024 10:30 AM Stephen Mckeon PT [facility]

## 2024-07-16 NOTE — THERAPY EVALUATION
Víctor Ashraf  : 1965  Primary: [facility]  Secondary:  [facility] @ [place] [address] [address] [place] [address]  Phone: [phone]  Fax: [phone] Plan Frequency: 2x a week for 90 days      Plan of Care/Certification Expiration Date: 10/13/24          Plan of Care/Certification Expiration Date:  Plan of Care/Certification Expiration Date: 10/13/24      Frequency/Duration:   Plan Frequency: 2x a week for 90 days        Time In/Out:   Time In: 1030  Time Out: 1130      PT Visit Info:    Progress Note Counter: 1      Visit Count:  1                OUTPATIENT PHYSICAL THERAPY:             Initial Assessment 2024               Episode (L knee pain)         Treatment Diagnosis:  M17.12; M76.50;M22.42   Patellofemoral arthritis of left knee  Patellar tendinosis  Chondromalacia patellae, left knee  Medical/Referring Diagnosis:    Patellofemoral arthritis of left knee  Patellar tendinosis  Patella, chondromalacia, left      Referring Physician:  Stephen Fierro MD MD Orders:  PT Eval and Treat   Return MD Appt:  TBD  Date of Onset:  Onset Date: 20     Allergies:  Patient has no allergy information on record.  Restrictions/Precautions:    None      Medications Last Reviewed:  2024     SUBJECTIVE   History of Injury/Illness (Reason for Referral):  Pt. Attends PT with complaints of chronic L knee pain following a patellar fracture in 2020 when he fell on his L knee.  Patellar fracture healed well, but patient subsequently developed patellar chondromalacia and arthritis in the L patella.  Symptoms are mostly aggravated with twisting and combined lifting/twisting activities.  Prolonged walking will also aggravate the pain.  Swelling results if symptoms are aggravated and patient complains of the L knee giving way at times.  Pt. Notes intermittent PT has been helpful and reduces frequency of flare ups in the L knee.  Pt. Would like

## 2024-07-30 ENCOUNTER — APPOINTMENT (OUTPATIENT)
Dept: PHYSICAL THERAPY | Age: 59
End: 2024-07-30
Payer: COMMERCIAL

## 2024-08-13 ENCOUNTER — HOSPITAL ENCOUNTER (OUTPATIENT)
Dept: PHYSICAL THERAPY | Age: 59
Setting detail: RECURRING SERIES
Discharge: HOME | End: 2024-08-16
Payer: COMMERCIAL

## 2024-08-13 PROCEDURE — 97140 MANUAL THERAPY 1/> REGIONS: CPT

## 2024-08-13 PROCEDURE — 97110 THERAPEUTIC EXERCISES: CPT

## 2024-08-13 ASSESSMENT — PAIN SCALES - GENERAL: PAINLEVEL_OUTOF10: 2

## 2024-08-13 NOTE — PROGRESS NOTES
Víctor Ashraf  : 1965  Primary: [place] [employer] [facility]  Secondary:  [facility] @ [place] [address] [place] [address]  Phone: [phone]  Fax: [phone] Plan Frequency: 2x a week for 90 days    Plan of Care/Certification Expiration Date: 10/13/24        Plan of Care/Certification Expiration Date:  Plan of Care/Certification Expiration Date: 10/13/24    Frequency/Duration:   Plan Frequency: 2x a week for 90 days      Time In/Out:   Time In: 1030  Time Out: 1130      PT Visit Info:    Progress Note Counter: 2      Visit Count:  2    OUTPATIENT PHYSICAL THERAPY:   Treatment Note 2024       Episode  (L knee pain)               Treatment Diagnosis:  M17.12; M76.50;M22.42   Patellofemoral arthritis of left knee  Patellar tendinosis  Chondromalacia patellae, left knee  Medical/Referring Diagnosis:    Patellofemoral arthritis of left knee  Patellar tendinosis  Patella, chondromalacia, left      Referring Physician:  Stephen Fierro MD MD Orders:  PT Eval and Treat   Return MD Appt:  TBD   Date of Onset:  Onset Date: 20     Allergies:   Patient has no allergy information on record.  Restrictions/Precautions:   None      Interventions Planned (Treatment may consist of any combination of the following):     See Assessment Note    Subjective Comments:   Pt. Notes L knee pain  Initial Pain Level::     2/10  Post Session Pain Level:       1/10  Medications Last Reviewed:  2024  Updated Objective Findings:  None Today  Treatment   THERAPEUTIC EXERCISE: (40 minutes):    Exercises per grid below to improve mobility and strength.  Required minimal visual, verbal, and manual cues to promote proper body alignment, promote proper body posture, and promote proper body mechanics.  Progressed resistance, range, and repetitions as indicated.   Date:  2024   Activity/Exercise Parameters   Sit to stand (0-15#) 30 reps   Shuttle LE press (5 bands) 5 minutes

## 2024-08-27 ENCOUNTER — HOSPITAL ENCOUNTER (OUTPATIENT)
Dept: PHYSICAL THERAPY | Age: 59
Setting detail: RECURRING SERIES
Discharge: HOME | End: 2024-08-30
Payer: COMMERCIAL

## 2024-08-27 PROCEDURE — 97110 THERAPEUTIC EXERCISES: CPT

## 2024-08-27 PROCEDURE — 97140 MANUAL THERAPY 1/> REGIONS: CPT

## 2024-08-27 ASSESSMENT — PAIN SCALES - GENERAL: PAINLEVEL_OUTOF10: 2

## 2024-08-27 NOTE — PROGRESS NOTES
Víctor Ashraf  : 1965  Primary: [place] [employer] [facility]  Secondary:  [facility] @ [place] [address] [place] [address]  Phone: [phone]  Fax: [phone] Plan Frequency: 2x a week for 90 days    Plan of Care/Certification Expiration Date: 10/13/24        Plan of Care/Certification Expiration Date:  Plan of Care/Certification Expiration Date: 10/13/24    Frequency/Duration:   Plan Frequency: 2x a week for 90 days      Time In/Out:   Time In: 1030  Time Out: 1130      PT Visit Info:    Progress Note Counter: 3      Visit Count:  3    OUTPATIENT PHYSICAL THERAPY:   Treatment Note 2024       Episode  (L knee pain)               Treatment Diagnosis:  M17.12; M76.50;M22.42   Patellofemoral arthritis of left knee  Patellar tendinosis  Chondromalacia patellae, left knee  Medical/Referring Diagnosis:    Patellofemoral arthritis of left knee  Patellar tendinosis  Patella, chondromalacia, left      Referring Physician:  Stephen Fierro MD MD Orders:  PT Eval and Treat   Return MD Appt:  TBD   Date of Onset:  Onset Date: 20     Allergies:   Patient has no allergy information on record.  Restrictions/Precautions:   None      Interventions Planned (Treatment may consist of any combination of the following):     See Assessment Note    Subjective Comments:   Pt. Reports L knee pain remains irritable with work activities.  Pt. Notes he is scheduled to get another injection next month.   Initial Pain Level::     2/10  Post Session Pain Level:       1/10  Medications Last Reviewed:  2024  Updated Objective Findings:  None Today  Treatment   THERAPEUTIC EXERCISE: (40 minutes):    Exercises per grid below to improve mobility and strength.  Required minimal visual, verbal, and manual cues to promote proper body alignment, promote proper body posture, and promote proper body mechanics.  Progressed resistance, range, and repetitions as indicated.    Date:  8/27/2024   Activity/Exercise Parameters   Sit to stand (0-15#) 30 reps   Shuttle LE press (5 bands) 5 minutes   Bridges with LE extension 3 x 10   Sidelying hip abduction 2 x 10   Single LE squat elevated surface 3 x 10   planks --   Golfers lift 3 x 10   Lateral walking (red band) --   Quadriceps stretch 3 minutes   Lateral hip/IT band stretch 4 minutes   Deadlift (25-40#) 3 x 10   Bird dog with cook band resistance 3 x 10     Manual Therapy (15  Minutes): Manual techniques to facilitate improved motion and decrease pain. (Used abbreviations; PNF - proprioceptive neuromuscular facilitation; a/p - anterior to posterior; p/a - posterior to anterior; cpa - central posterior anterior; upa -unilateral posterior anterior; SAL- superior anterior lateral glide; HVLAT - High Velocity Low Amplitude Thrust)  Soft tissue mobilization: L quadriceps, gluteals, calf  Joint mobilization: patellofemoral joint glides all directions grade II-III      Treatment/Session Summary:    Treatment Assessment:   Superior patellar glides reproduce pain today within session.  Pt. Continues to demonstrate altered patellar tilt likely contributing to chronic symptoms.     Communication/Consultation:  None today  Equipment provided today:  HEP  Recommendations/Intent for next treatment session: Next visit will focus on manual therapy, flexibility, and lower quarter strengthening.    >Total Treatment Billable Duration:  55 minutes total time    Time In: 1030  Time Out: 1130    Stephen Mckeon PT         Charge Capture  Events  [employer] [facility]  Appt Desk  Attendance Report     No future appointments.

## 2024-10-08 ENCOUNTER — HOSPITAL ENCOUNTER (OUTPATIENT)
Dept: PHYSICAL THERAPY | Age: 59
Setting detail: RECURRING SERIES
Discharge: HOME | End: 2024-10-11
Payer: COMMERCIAL

## 2024-10-08 PROCEDURE — 97140 MANUAL THERAPY 1/> REGIONS: CPT

## 2024-10-08 PROCEDURE — 97110 THERAPEUTIC EXERCISES: CPT

## 2024-10-08 ASSESSMENT — PAIN SCALES - GENERAL: PAINLEVEL_OUTOF10: 2

## 2024-10-08 NOTE — PROGRESS NOTES
Víctor Ashraf  : 1965  Primary: [employer] [facility]  Secondary:  [facility] @ [place] [address] [place] [address]  Phone: [phone]  Fax: [phone] Plan Frequency: 2x a week for 90 days    Plan of Care/Certification Expiration Date: 10/13/24        Plan of Care/Certification Expiration Date:  Plan of Care/Certification Expiration Date: 10/13/24    Frequency/Duration:   Plan Frequency: 2x a week for 90 days      Time In/Out:   Time In: 1130  Time Out: 1230      PT Visit Info:    Progress Note Counter: 4      Visit Count:  4    OUTPATIENT PHYSICAL THERAPY:   Treatment Note 10/8/2024       Episode  (L knee pain)               Treatment Diagnosis:  M17.12; M76.50;M22.42   Patellofemoral arthritis of left knee  Patellar tendinosis  Chondromalacia patellae, left knee  Medical/Referring Diagnosis:    Patellofemoral arthritis of left knee  Patellar tendinosis  Patella, chondromalacia, left      Referring Physician:  Stephen Fierro MD MD Orders:  PT Eval and Treat   Return MD Appt:  TBD   Date of Onset:  Onset Date: 20     Allergies:   Patient has no allergy information on record.  Restrictions/Precautions:   None      Interventions Planned (Treatment may consist of any combination of the following):     See Assessment Note    Subjective Comments:   Pt. Notes he received another injection in the L knee.  Pt. Reports new MRI revealed arthritis  Initial Pain Level::     2/10  Post Session Pain Level:       1/10  Medications Last Reviewed:  10/8/2024  Updated Objective Findings:   single LE squat on L side dysfunctional and painful   Treatment   THERAPEUTIC EXERCISE: (40 minutes):    Exercises per grid below to improve mobility and strength.  Required minimal visual, verbal, and manual cues to promote proper body alignment, promote proper body posture, and promote proper body mechanics.  Progressed resistance, range, and repetitions as indicated.

## 2024-10-22 ENCOUNTER — HOSPITAL ENCOUNTER (OUTPATIENT)
Dept: PHYSICAL THERAPY | Age: 59
Setting detail: RECURRING SERIES
Discharge: HOME | End: 2024-10-25
Payer: COMMERCIAL

## 2024-10-22 PROCEDURE — 97110 THERAPEUTIC EXERCISES: CPT

## 2024-10-22 PROCEDURE — 97140 MANUAL THERAPY 1/> REGIONS: CPT

## 2024-10-22 ASSESSMENT — PAIN SCALES - GENERAL: PAINLEVEL_OUTOF10: 2

## 2024-10-22 NOTE — PROGRESS NOTES
Víctor Ashraf  : 1965  Primary: [place] [employer] [facility]  Secondary:  [facility] @ [place] [address] [place] [address]  Phone: [phone]  Fax: [phone] Plan Frequency: 2x a week for 90 days    Plan of Care/Certification Expiration Date: 25        Plan of Care/Certification Expiration Date:  Plan of Care/Certification Expiration Date: 25    Frequency/Duration:   Plan Frequency: 2x a week for 90 days      Time In/Out:   Time In: 1130  Time Out: 1230      PT Visit Info:    Progress Note Counter: 5      Visit Count:  5    OUTPATIENT PHYSICAL THERAPY:   Treatment Note 10/22/2024       Episode  (L knee pain)               Treatment Diagnosis:  M17.12; M76.50;M22.42   Patellofemoral arthritis of left knee  Patellar tendinosis  Chondromalacia patellae, left knee  Medical/Referring Diagnosis:    Patellofemoral arthritis of left knee  Patellar tendinosis  Patella, chondromalacia, left      Referring Physician:  Stephen Fierro MD MD Orders:  PT Eval and Treat   Return MD Appt:  TBD   Date of Onset:  Onset Date: 20     Allergies:   Patient has no allergy information on record.  Restrictions/Precautions:   None      Interventions Planned (Treatment may consist of any combination of the following):     See Assessment Note    Subjective Comments:   Pt. Denies new pain in the L knee since last PT session  Initial Pain Level::     2/10  Post Session Pain Level:       1/10  Medications Last Reviewed:  10/22/2024  Updated Objective Findings:  See Recertification Note from today  Treatment   THERAPEUTIC EXERCISE: (40 minutes):    Exercises per grid below to improve mobility and strength.  Required minimal visual, verbal, and manual cues to promote proper body alignment, promote proper body posture, and promote proper body mechanics.  Progressed resistance, range, and repetitions as indicated.   Date:  10/22/2024   Activity/Exercise Parameters   Sit to

## 2024-10-22 NOTE — THERAPY RECERTIFICATION
Víctor Ashraf  : 1965  [facility]  Secondary:  [facility] @ [place]  [place] [place] [address]  Phone: [phone]  Fax: [phone] Plan Frequency: 2x a week for 90 days      Plan of Care/Certification Expiration Date: 25          Plan of Care/Certification Expiration Date:  Plan of Care/Certification Expiration Date: 25      Frequency/Duration:   Plan Frequency: 2x a week for 90 days        Time In/Out:   Time In: 1130  Time Out: 1230      PT Visit Info:    Progress Note Counter: 5      Visit Count:  5                OUTPATIENT PHYSICAL THERAPY:             Recertification 10/22/2024               Episode (L knee pain)         Treatment Diagnosis:  M17.12; M76.50;M22.42   Patellofemoral arthritis of left knee  Patellar tendinosis  Chondromalacia patellae, left knee  Medical/Referring Diagnosis:    Patellofemoral arthritis of left knee  Patellar tendinosis  Patella, chondromalacia, left      Referring Physician:  Stephen Fierro MD MD Orders:  PT Eval and Treat   Return MD Appt:  TBD  Date of Onset:  Onset Date: 20     Allergies:  Patient has no allergy information on record.  Restrictions/Precautions:    None      Medications Last Reviewed:  10/22/2024     SUBJECTIVE   History of Injury/Illness (Reason for Referral):  Pt. Attends PT with complaints of chronic L knee pain following a patellar fracture in 2020 when he fell on his L knee.  Patellar fracture healed well, but patient subsequently developed patellar chondromalacia and arthritis in the L patella.  Symptoms are mostly aggravated with twisting and combined lifting/twisting activities.  Prolonged walking will also aggravate the pain.  Swelling results if symptoms are aggravated and patient complains of the L knee giving way at times.  Pt. Notes intermittent PT has been helpful and reduces frequency of flare ups in the L knee.  Pt. Would like to

## 2024-11-05 ENCOUNTER — APPOINTMENT (OUTPATIENT)
Dept: PHYSICAL THERAPY | Age: 59
End: 2024-11-05
Payer: COMMERCIAL

## 2024-11-12 ENCOUNTER — HOSPITAL ENCOUNTER (OUTPATIENT)
Dept: PHYSICAL THERAPY | Age: 59
Setting detail: RECURRING SERIES
Discharge: HOME | End: 2024-11-15
Payer: COMMERCIAL

## 2024-11-12 PROCEDURE — 97140 MANUAL THERAPY 1/> REGIONS: CPT

## 2024-11-12 PROCEDURE — 97110 THERAPEUTIC EXERCISES: CPT

## 2024-11-12 ASSESSMENT — PAIN SCALES - GENERAL: PAINLEVEL_OUTOF10: 2

## 2024-11-12 NOTE — PROGRESS NOTES
Víctor Ashraf  : 1965  [facility]: [facility] ([facility]  Secondary:  [facility] @ [place]  [address] [place] [address]  Phone: [phone]  Fax: [phone] Plan Frequency: 2x a week for 90 days    Plan of Care/Certification Expiration Date: 25        Plan of Care/Certification Expiration Date:  Plan of Care/Certification Expiration Date: 25    Frequency/Duration:   Plan Frequency: 2x a week for 90 days      Time In/Out:   Time In: 1030  Time Out: 1130      PT Visit Info:    Progress Note Counter: 6      Visit Count:  6    OUTPATIENT PHYSICAL THERAPY:   Treatment Note 2024       Episode  (L knee pain)               Treatment Diagnosis:  M17.12; M76.50;M22.42   Patellofemoral arthritis of left knee  Patellar tendinosis  Chondromalacia patellae, left knee  Medical/Referring Diagnosis:    Patellofemoral arthritis of left knee  Patellar tendinosis  Patella, chondromalacia, left      Referring Physician:  Stephen Fierro MD MD Orders:  PT Eval and Treat   Return MD Appt:  TBD   Date of Onset:  Onset Date: 20     Allergies:   Patient has no allergy information on record.  Restrictions/Precautions:   None      Interventions Planned (Treatment may consist of any combination of the following):     See Assessment Note    Subjective Comments:   Pt. Reports increased work demand secondary to the holidays coming.  Pt. Notes mild L knee pain and R shoulder, neck, and arm pain.   Initial Pain Level::     2/10  Post Session Pain Level:       1/10  Medications Last Reviewed:  2024  Updated Objective Findings:  None Today  Treatment   THERAPEUTIC EXERCISE: (40 minutes):    Exercises per grid below to improve mobility and strength.  Required minimal visual, verbal, and manual cues to promote proper body alignment, promote proper body posture, and promote proper body mechanics.  Progressed resistance, range, and repetitions as indicated.

## 2024-11-19 ENCOUNTER — APPOINTMENT (OUTPATIENT)
Dept: PHYSICAL THERAPY | Age: 59
End: 2024-11-19
Payer: COMMERCIAL

## 2024-11-26 ENCOUNTER — APPOINTMENT (OUTPATIENT)
Dept: PHYSICAL THERAPY | Age: 59
End: 2024-11-26
Payer: COMMERCIAL

## 2025-02-11 ENCOUNTER — HOSPITAL ENCOUNTER (OUTPATIENT)
Dept: PHYSICAL THERAPY | Age: 60
Setting detail: RECURRING SERIES
Discharge: HOME | End: 2025-02-14
Payer: COMMERCIAL

## 2025-02-11 PROCEDURE — 97140 MANUAL THERAPY 1/> REGIONS: CPT

## 2025-02-11 PROCEDURE — 97110 THERAPEUTIC EXERCISES: CPT

## 2025-02-11 ASSESSMENT — PAIN SCALES - GENERAL: PAINLEVEL_OUTOF10: 2

## 2025-02-11 NOTE — THERAPY RECERTIFICATION
Víctor Ashraf  : 1965  Primary: [employer] ([employer])  Secondary:  [facility] @ [place] [address] [place] [address]  Phone: [phone]  Fax: [phone] Plan Frequency: 2x a week for 90 days      Plan of Care/Certification Expiration Date: 25          Plan of Care/Certification Expiration Date:  Plan of Care/Certification Expiration Date: 25      Frequency/Duration:   Plan Frequency: 2x a week for 90 days        Time In/Out:   Time In: 830  Time Out: 930      PT Visit Info:    Progress Note Counter: 0      Visit Count:  7                OUTPATIENT PHYSICAL THERAPY:             Recertification 2025               Episode (L knee pain)         Treatment Diagnosis:  M17.12; M76.50;M22.42   Patellofemoral arthritis of left knee  Patellar tendinosis  Chondromalacia patellae, left knee  Medical/Referring Diagnosis:    Patellofemoral arthritis of left knee  Patellar tendinosis  Patella, chondromalacia, left      Referring Physician:  Stephen Fierro MD MD Orders:  PT Eval and Treat   Return MD Appt:  TBD  Date of Onset:  Onset Date: 20     Allergies:  Patient has no allergy information on record.  Restrictions/Precautions:    None      Medications Last Reviewed:  2025     SUBJECTIVE   History of Injury/Illness (Reason for Referral):  Pt. Attends PT with complaints of chronic L knee pain following a patellar fracture in 2020 when he fell on his L knee.  Patellar fracture healed well, but patient subsequently developed patellar chondromalacia and arthritis in the L patella.  Symptoms are mostly aggravated with twisting and combined lifting/twisting activities.  Prolonged walking will also aggravate the pain.  Swelling results if symptoms are aggravated and patient complains of the L knee giving way at times.  Pt. Notes intermittent PT has been helpful and reduces frequency of flare ups in the L knee.  Pt. Would like to

## 2025-02-11 NOTE — PROGRESS NOTES
Víctor Ashraf  : 1965  Primary: [employer] ([employer])  Secondary:  [facility] @ [place] [address] [place] [address]  Phone: [phone]  Fax: [phone] Plan Frequency: 2x a week for 90 days    Plan of Care/Certification Expiration Date: 25        Plan of Care/Certification Expiration Date:  Plan of Care/Certification Expiration Date: 25    Frequency/Duration:   Plan Frequency: 2x a week for 90 days      Time In/Out:   Time In: 830  Time Out: 930      PT Visit Info:    Progress Note Counter: 0      Visit Count:  7    OUTPATIENT PHYSICAL THERAPY:   Treatment Note 2025       Episode  (L knee pain)               Treatment Diagnosis:  M17.12; M76.50;M22.42   Patellofemoral arthritis of left knee  Patellar tendinosis  Chondromalacia patellae, left knee  Medical/Referring Diagnosis:    Patellofemoral arthritis of left knee  Patellar tendinosis  Patella, chondromalacia, left      Referring Physician:  Stephen Fierro MD MD Orders:  PT Eval and Treat   Return MD Appt:  TBD   Date of Onset:  Onset Date: 20     Allergies:   Patient has no allergy information on record.  Restrictions/Precautions:   None      Interventions Planned (Treatment may consist of any combination of the following):     See Assessment Note    Subjective Comments:   Pt. Notes he knee pain has not elevated over the past few months.  Pt. Notes is will swell with prolonged standing and walking at work.    Initial Pain Level::     2/10  Post Session Pain Level:       /10  Medications Last Reviewed:  2025  Updated Objective Findings:  See Recertification Note from today  Treatment   THERAPEUTIC EXERCISE: (40 minutes):    Exercises per grid below to improve mobility and strength.  Required minimal visual, verbal, and manual cues to promote proper body alignment, promote proper body posture, and promote proper body mechanics.  Progressed resistance, range, and repetitions

## 2025-02-25 ENCOUNTER — HOSPITAL ENCOUNTER (OUTPATIENT)
Dept: PHYSICAL THERAPY | Age: 60
Setting detail: RECURRING SERIES
Discharge: HOME | End: 2025-02-28
Payer: COMMERCIAL

## 2025-02-25 PROCEDURE — 97140 MANUAL THERAPY 1/> REGIONS: CPT

## 2025-02-25 PROCEDURE — 97110 THERAPEUTIC EXERCISES: CPT

## 2025-02-25 ASSESSMENT — PAIN SCALES - GENERAL: PAINLEVEL_OUTOF10: 2

## 2025-02-25 NOTE — PROGRESS NOTES
Víctor Ashraf  : 1965  Primary: [employer] ([employer]  Secondary:  [facility] @ [place] [address] [place] [address]  Phone: [phone]  Fax: [phone] Plan Frequency: 2x a week for 90 days    Plan of Care/Certification Expiration Date: 25        Plan of Care/Certification Expiration Date:  Plan of Care/Certification Expiration Date: 25    Frequency/Duration:   Plan Frequency: 2x a week for 90 days      Time In/Out:   Time In: 08  Time Out: 09      PT Visit Info:    Progress Note Counter: 1      Visit Count:  8    OUTPATIENT PHYSICAL THERAPY:   Treatment Note 2025       Episode  (L knee pain)               Treatment Diagnosis:  M17.12; M76.50;M22.42   Patellofemoral arthritis of left knee  Patellar tendinosis  Chondromalacia patellae, left knee  Medical/Referring Diagnosis:    Patellofemoral arthritis of left knee  Patellar tendinosis  Patella, chondromalacia, left      Referring Physician:  Stephen Fierro MD MD Orders:  PT Eval and Treat   Return MD Appt:  TBD   Date of Onset:  Onset Date: 20     Allergies:   Patient has no allergy information on record.  Restrictions/Precautions:   None      Interventions Planned (Treatment may consist of any combination of the following):     See Assessment Note    Subjective Comments:   Pt. Denies new knee pain complaints.    Initial Pain Level::     2/10  Post Session Pain Level:       2/10  Medications Last Reviewed:  2025  Updated Objective Findings:  None Today  Treatment   THERAPEUTIC EXERCISE: (40 minutes):    Exercises per grid below to improve mobility and strength.  Required minimal visual, verbal, and manual cues to promote proper body alignment, promote proper body posture, and promote proper body mechanics.  Progressed resistance, range, and repetitions as indicated.   Date:  2025   Activity/Exercise Parameters   Sit to stand (0-15#) 30 reps   Shuttle LE press (5 bands) 5

## 2025-03-11 ENCOUNTER — HOSPITAL ENCOUNTER (OUTPATIENT)
Dept: PHYSICAL THERAPY | Age: 60
Setting detail: RECURRING SERIES
Discharge: HOME | End: 2025-03-14
Payer: COMMERCIAL

## 2025-03-11 PROCEDURE — 97110 THERAPEUTIC EXERCISES: CPT

## 2025-03-11 PROCEDURE — 97140 MANUAL THERAPY 1/> REGIONS: CPT

## 2025-03-11 ASSESSMENT — PAIN SCALES - GENERAL: PAINLEVEL_OUTOF10: 2

## 2025-03-11 NOTE — PROGRESS NOTES
Víctor Ashraf  : 1965  Primary: [employer] ([employer])  Secondary:  [facility] @ [place] [address] [place] [address]  Phone: [phone]  Fax: [phone] Plan Frequency: 2x a week for 90 days    Plan of Care/Certification Expiration Date: 25        Plan of Care/Certification Expiration Date:  Plan of Care/Certification Expiration Date: 25    Frequency/Duration:   Plan Frequency: 2x a week for 90 days      Time In/Out:   Time In: 0930  Time Out: 1030      PT Visit Info:    Progress Note Counter: 2      Visit Count:  9    OUTPATIENT PHYSICAL THERAPY:   Treatment Note 3/11/2025       Episode  (L knee pain)               Treatment Diagnosis:  M17.12; M76.50;M22.42   Patellofemoral arthritis of left knee  Patellar tendinosis  Chondromalacia patellae, left knee  Medical/Referring Diagnosis:    Patellofemoral arthritis of left knee  Patellar tendinosis  Patella, chondromalacia, left      Referring Physician:  Stephen Fierro MD MD Orders:  PT Eval and Treat   Return MD Appt:  TBD   Date of Onset:  Onset Date: 20     Allergies:   Patient has no allergy information on record.  Restrictions/Precautions:   None      Interventions Planned (Treatment may consist of any combination of the following):     See Assessment Note    Subjective Comments:   Pt. Reports neck pain, shoulder pain, and forearm pain from work duties.     Initial Pain Level::     2/10  Post Session Pain Level:       2/10  Medications Last Reviewed:  3/11/2025  Updated Objective Findings:   wrist extensor test (+) for lateral epicondylagia bilaterally.   Treatment   THERAPEUTIC EXERCISE: (40 minutes):    Exercises per grid below to improve mobility and strength.  Required minimal visual, verbal, and manual cues to promote proper body alignment, promote proper body posture, and promote proper body mechanics.  Progressed resistance, range, and repetitions as indicated.   Date:  3/11/2025

## 2025-04-07 ENCOUNTER — APPOINTMENT (OUTPATIENT)
Dept: PHYSICAL THERAPY | Age: 60
End: 2025-04-07
Payer: COMMERCIAL

## 2025-04-08 ENCOUNTER — HOSPITAL ENCOUNTER (OUTPATIENT)
Dept: PHYSICAL THERAPY | Age: 60
Setting detail: RECURRING SERIES
Discharge: HOME | End: 2025-04-11
Payer: COMMERCIAL

## 2025-04-08 PROCEDURE — 97140 MANUAL THERAPY 1/> REGIONS: CPT

## 2025-04-08 PROCEDURE — 97110 THERAPEUTIC EXERCISES: CPT

## 2025-04-08 ASSESSMENT — PAIN SCALES - GENERAL: PAINLEVEL_OUTOF10: 2

## 2025-04-08 NOTE — PROGRESS NOTES
Víctor Ashraf  : 1965  Primary: [employer] ([employer])  Secondary:  [facility] @ [place] [address] [place] [address]  Phone: [phone]  Fax: [phone] Plan Frequency: 2x a week for 90 days    Plan of Care/Certification Expiration Date: 25        Plan of Care/Certification Expiration Date:  Plan of Care/Certification Expiration Date: 25    Frequency/Duration:   Plan Frequency: 2x a week for 90 days      Time In/Out:   Time In: 830  Time Out: 930      PT Visit Info:    Progress Note Counter: 3      Visit Count:  10    OUTPATIENT PHYSICAL THERAPY:   Treatment Note 2025       Episode  (L knee pain)               Treatment Diagnosis:  M17.12; M76.50;M22.42   Patellofemoral arthritis of left knee  Patellar tendinosis  Chondromalacia patellae, left knee  Medical/Referring Diagnosis:    Patellofemoral arthritis of left knee  Patellar tendinosis  Patella, chondromalacia, left      Referring Physician:  Stephen Fierro MD MD Orders:  PT Eval and Treat   Return MD Appt:  TBD   Date of Onset:  Onset Date: 20     Allergies:   Patient has no allergy information on record.  Restrictions/Precautions:   None      Interventions Planned (Treatment may consist of any combination of the following):     See Assessment Note    Subjective Comments:   Pt. Notes he has been dealing with plantar fasciitis in the R foot.  Pt. Notes he received a steroid injection yesterday in the R foot.      Initial Pain Level::     2/10  Post Session Pain Level:       2/10  Medications Last Reviewed:  2025  Updated Objective Findings:  None Today  Treatment   THERAPEUTIC EXERCISE: (30 minutes):    Exercises per grid below to improve mobility and strength.  Required minimal visual, verbal, and manual cues to promote proper body alignment, promote proper body posture, and promote proper body mechanics.  Progressed resistance, range, and repetitions as indicated.

## 2025-04-21 ENCOUNTER — HOSPITAL ENCOUNTER (OUTPATIENT)
Dept: PHYSICAL THERAPY | Age: 60
Setting detail: RECURRING SERIES
Discharge: HOME | End: 2025-04-24
Payer: COMMERCIAL

## 2025-04-21 PROCEDURE — 97140 MANUAL THERAPY 1/> REGIONS: CPT

## 2025-04-21 PROCEDURE — 97110 THERAPEUTIC EXERCISES: CPT

## 2025-04-21 ASSESSMENT — PAIN SCALES - GENERAL: PAINLEVEL_OUTOF10: 4

## 2025-04-21 NOTE — PROGRESS NOTES
Víctor Ashraf  : 1965  Primary: [employer] ([employer])  Secondary:  [facility] @ [place] [address] [place] [address]  Phone: [phone]  Fax: [phone] Plan Frequency: 2x a week for 90 days    Plan of Care/Certification Expiration Date: 25        Plan of Care/Certification Expiration Date:  Plan of Care/Certification Expiration Date: 25    Frequency/Duration:   Plan Frequency: 2x a week for 90 days      Time In/Out:   Time In: 930  Time Out: 1030      PT Visit Info:    Progress Note Counter: 4      Visit Count:  11    OUTPATIENT PHYSICAL THERAPY:   Treatment Note 2025       Episode  (L knee pain)               Treatment Diagnosis:  M17.12; M76.50;M22.42   Patellofemoral arthritis of left knee  Patellar tendinosis  Chondromalacia patellae, left knee  Medical/Referring Diagnosis:    Patellofemoral arthritis of left knee  Patellar tendinosis  Patella, chondromalacia, left      Referring Physician:  Stephen Fierro MD MD Orders:  PT Eval and Treat   Return MD Appt:  TBD   Date of Onset:  Onset Date: 20     Allergies:   Patient has no allergy information on record.  Restrictions/Precautions:   None      Interventions Planned (Treatment may consist of any combination of the following):     See Assessment Note    Subjective Comments:   Pt. Reports flare up of L knee pain along with new pain in the neck, back, shoulders following denial of light duty at work over the past two weeks.  Pt. Has been restricted from work since last Friday as confusion over his medical restrictions remain.      Initial Pain Level::     4/10  Post Session Pain Level:       3/10  Medications Last Reviewed:  2025  Updated Objective Findings:   absence of swelling in the L knee.  Tender with all patellar glides.  Treatment   THERAPEUTIC EXERCISE: (15 minutes):    Exercises per grid below to improve mobility and strength.  Required minimal visual, verbal, and

## 2025-04-28 ENCOUNTER — HOSPITAL ENCOUNTER (OUTPATIENT)
Dept: PHYSICAL THERAPY | Age: 60
Setting detail: RECURRING SERIES
Discharge: HOME | End: 2025-05-01
Payer: COMMERCIAL

## 2025-04-28 PROCEDURE — 97140 MANUAL THERAPY 1/> REGIONS: CPT

## 2025-04-28 PROCEDURE — 97110 THERAPEUTIC EXERCISES: CPT

## 2025-04-28 ASSESSMENT — PAIN SCALES - GENERAL: PAINLEVEL_OUTOF10: 4

## 2025-04-28 NOTE — PROGRESS NOTES
Víctor Ashraf  : 1965  Primary: [employer])  Secondary:  [facility] @ [place] [address] [place] [address]  Phone: [phone]  Fax: [phone] Plan Frequency: 2x a week for 90 days    Plan of Care/Certification Expiration Date: 25        Plan of Care/Certification Expiration Date:  Plan of Care/Certification Expiration Date: 25    Frequency/Duration:   Plan Frequency: 2x a week for 90 days      Time In/Out:   Time In: 0730  Time Out: 0830      PT Visit Info:    Progress Note Counter: 5      Visit Count:  12    OUTPATIENT PHYSICAL THERAPY:   Treatment Note 2025       Episode  (L knee pain)               Treatment Diagnosis:  M17.12; M76.50;M22.42   Patellofemoral arthritis of left knee  Patellar tendinosis  Chondromalacia patellae, left knee  Medical/Referring Diagnosis:    Patellofemoral arthritis of left knee  Patellar tendinosis  Patella, chondromalacia, left      Referring Physician:  Stephen Fierro MD MD Orders:  PT Eval and Treat   Return MD Appt:  TBD   Date of Onset:  Onset Date: 20     Allergies:   Patient has no allergy information on record.  Restrictions/Precautions:   None      Interventions Planned (Treatment may consist of any combination of the following):     See Assessment Note    Subjective Comments:   Pt. Notes he remains out of work as work restriction issue continues.       Initial Pain Level::     4/10  Post Session Pain Level:       3/10  Medications Last Reviewed:  2025  Updated Objective Findings:  None Today  Treatment   THERAPEUTIC EXERCISE: (30 minutes):    Exercises per grid below to improve mobility and strength.  Required minimal visual, verbal, and manual cues to promote proper body alignment, promote proper body posture, and promote proper body mechanics.  Progressed resistance, range, and repetitions as indicated.   Date:  2025   Activity/Exercise Parameters   Sit to stand (0-15#) 3

## 2025-05-06 ENCOUNTER — HOSPITAL ENCOUNTER (OUTPATIENT)
Dept: PHYSICAL THERAPY | Age: 60
Setting detail: RECURRING SERIES
Discharge: HOME | End: 2025-05-09
Payer: COMMERCIAL

## 2025-05-06 PROCEDURE — 97110 THERAPEUTIC EXERCISES: CPT

## 2025-05-06 PROCEDURE — 97140 MANUAL THERAPY 1/> REGIONS: CPT

## 2025-05-06 ASSESSMENT — PAIN SCALES - GENERAL: PAINLEVEL_OUTOF10: 4

## 2025-05-06 NOTE — PROGRESS NOTES
Víctor Ashraf  : 1965  Primary: [employer] ([employer])  Secondary:  [facility] @ [place] [address] [place] [address]  Phone: [phone]  Fax: [phone] Plan Frequency: 2x a week for 90 days    Plan of Care/Certification Expiration Date: 25        Plan of Care/Certification Expiration Date:  Plan of Care/Certification Expiration Date: 25    Frequency/Duration:   Plan Frequency: 2x a week for 90 days      Time In/Out:   Time In: 930  Time Out: 1030      PT Visit Info:    Progress Note Counter: 6      Visit Count:  13    OUTPATIENT PHYSICAL THERAPY:   Treatment Note 2025       Episode  (L knee pain)               Treatment Diagnosis:  M17.12; M76.50;M22.42   Patellofemoral arthritis of left knee  Patellar tendinosis  Chondromalacia patellae, left knee  Medical/Referring Diagnosis:    Patellofemoral arthritis of left knee  Patellar tendinosis  Patella, chondromalacia, left      Referring Physician:  Stephen Fierro MD MD Orders:  PT Eval and Treat   Return MD Appt:  TBD   Date of Onset:  Onset Date: 20     Allergies:   Patient has no allergy information on record.  Restrictions/Precautions:   None      Interventions Planned (Treatment may consist of any combination of the following):     See Assessment Note    Subjective Comments:   Pt. Reports he was able to return to work last week.  Pt. Is on 8 hour shift of more vigorous job.  Pt. Notes his knee gets very painful toward the end of shift with some swelling/popping.        Initial Pain Level::     4/10  Post Session Pain Level:       3/10  Medications Last Reviewed:  2025  Updated Objective Findings:   No swelling in L knee present today,  pain with superior L patellar glides.   Treatment   THERAPEUTIC EXERCISE: (30 minutes):    Exercises per grid below to improve mobility and strength.  Required minimal visual, verbal, and manual cues to promote proper body alignment, promote proper